# Patient Record
Sex: MALE | Race: BLACK OR AFRICAN AMERICAN | NOT HISPANIC OR LATINO | Employment: FULL TIME | URBAN - METROPOLITAN AREA
[De-identification: names, ages, dates, MRNs, and addresses within clinical notes are randomized per-mention and may not be internally consistent; named-entity substitution may affect disease eponyms.]

---

## 2023-03-16 ENCOUNTER — NURSE TRIAGE (OUTPATIENT)
Dept: FAMILY MEDICINE | Facility: CLINIC | Age: 43
End: 2023-03-16

## 2023-03-16 NOTE — TELEPHONE ENCOUNTER
"Pt calling in regarding abdominal pain and pain with BM. Pt states he has a hx of \"stones\" in his body but he was unable to do the surgery because of cost. Pt states that it is \"too bad now with the pain I can't take it anymore.\" Pt has not been seen at  before, no records on file. Advised pt to be seen in ED due to pain, pt states \"I can't just be seen in the clinic?\" RN informed pt we would not be able to help with pain in clinic, should be seen in ED. No further questions.    Lavelle SULLIVAN RN    Reason for Disposition    SEVERE abdominal pain (e.g., excruciating)    Additional Information    Negative: Passed out (i.e., fainted, collapsed and was not responding)    Negative: Shock suspected (e.g., cold/pale/clammy skin, too weak to stand, low BP, rapid pulse)    Negative: Sounds like a life-threatening emergency to the triager    Negative: Chest pain    Negative: Pain is mainly in upper abdomen (if needed ask: 'is it mainly above the belly button?')    Protocols used: ABDOMINAL PAIN - MALE-A-OH    Lavelle SULLIVAN RN    "

## 2023-03-18 ENCOUNTER — HOSPITAL ENCOUNTER (EMERGENCY)
Facility: CLINIC | Age: 43
Discharge: HOME | End: 2023-03-18
Attending: EMERGENCY MEDICINE | Admitting: EMERGENCY MEDICINE
Payer: COMMERCIAL

## 2023-03-18 VITALS
OXYGEN SATURATION: 99 % | DIASTOLIC BLOOD PRESSURE: 82 MMHG | HEART RATE: 71 BPM | TEMPERATURE: 97.9 F | SYSTOLIC BLOOD PRESSURE: 131 MMHG | RESPIRATION RATE: 16 BRPM

## 2023-03-18 DIAGNOSIS — K60.2 ANAL FISSURE: ICD-10-CM

## 2023-03-18 PROCEDURE — 99284 EMERGENCY DEPT VISIT MOD MDM: CPT

## 2023-03-18 RX ORDER — NITROGLYCERIN 4 MG/G
1 OINTMENT RECTAL EVERY 12 HOURS
Qty: 30 G | Refills: 0 | Status: SHIPPED | OUTPATIENT
Start: 2023-03-18 | End: 2023-06-01

## 2023-03-18 RX ORDER — DOCUSATE SODIUM 100 MG/1
100 CAPSULE, LIQUID FILLED ORAL DAILY
Qty: 30 CAPSULE | Refills: 0 | Status: SHIPPED | OUTPATIENT
Start: 2023-03-18 | End: 2023-06-01

## 2023-03-18 RX ORDER — LIDOCAINE 50 MG/G
OINTMENT TOPICAL PRN
Qty: 50 G | Refills: 0 | Status: SHIPPED | OUTPATIENT
Start: 2023-03-18 | End: 2023-06-01

## 2023-03-18 ASSESSMENT — ENCOUNTER SYMPTOMS
ANAL BLEEDING: 1
VOMITING: 0
FEVER: 0
ABDOMINAL PAIN: 0

## 2023-03-19 NOTE — ED TRIAGE NOTES
Rectal pain x 2 weeks that is worse with defecation.  Pt states it is painful to sit.  He has not observed any blood in his stool.  Pt has remote hx/o hemorrhoids.

## 2023-03-19 NOTE — ED PROVIDER NOTES
History     Chief Complaint:  Perianal Pain     The history is provided by the patient.      Dariusz Teague is a healthy 43 year old male who presents with perianal pain. For the past two weeks, Dariusz has had worsening pain with bowel movements which is now intolerable and ultimately prompted his visit. He has seen very small amounts of blood after bowel movements. After his bowel movements, his pain is present for about an hour before improving. His wife looked at his anus and did not see any hemorrhoids or bleeding.    Dariusz denies constipation and straining with bowel movements. He denies abdominal pain, vomiting, or fever.     Independent Historian:   As above.     Review of External Notes: I reviewed the patient's CT scan result from December 2022. There is no evidence of gallbladder or kidney stones.     ROS:  Review of Systems   Constitutional: Negative for fever.   Gastrointestinal: Positive for anal bleeding and rectal pain (perianal pain). Negative for abdominal pain, constipation, diarrhea and vomiting.   All other systems reviewed and are negative.    Allergies:  Zolmitriptan    Medications:    Zyprexa  Pulmicort  Pepcid  Compazine     Past Medical History:    Plasmodium falciparum malaria  Migraine   Vitamin D deficiency     Past Surgical History:    Left arm superficial wound repair     Social History:  He presents to the ED with a friend. He is a  and father.   Wants to change primary care providers as his is out of network and too costly.    Physical Exam     Patient Vitals for the past 24 hrs:   BP Temp Pulse Resp SpO2   03/18/23 1915 131/82 97.9  F (36.6  C) 71 16 99 %       Physical Exam  General: Well-developed and well-nourished. Well appearing middle aged man. Cooperative.  Head:  Atraumatic.  Eyes:  Conjunctivae, lids, and sclerae are normal.  Neck:  Supple. Normal range of motion.  Resp:  No respiratory distress.   GI:  Soft. Non-distended. Non-tender.   Rectal: RICCO without  mass or return of blood or stool.  No external hemorrhoids.  Tenderness to palpation over the posterior midline without easily visualized fissure.   MS:  Normal ROM.   Skin:  Warm. Non-diaphoretic. No pallor.  Neuro:  Awake. A&Ox3. Normal strength.  Psych: Normal mood and affect. Normal speech.  Vitals reviewed.    Emergency Department Course   Emergency Department Course & Assessments:  Assessments:  2005 I evaluated the patient and obtained an initial history and exam.     Independent Interpretation (X-rays, CTs, rhythm strip):  N/A    Consultations/Discussion of Management or Tests:  N/A     Social Determinants of Health affecting care:   Wants to change primary care provider. Strong family and friend support. Poor understanding of prior diagnoses (appendicitis).    Disposition:  The patient was discharged.     Impression & Plan    Medical Decision Making:  Dariusz is a 43 year old man who has had worsening perianal pain after bowel movements for the last 2 weeks.  It now is taking an hour afterwards to resolve.  He has had some small amounts of blood.  He denies abdominal pain or other concerns.  It sounds like he had appendicitis in December for which he refused appendectomy and took antibiotics instead.  He is very concerned that this is the cause for his symptoms.    On exam he has no abdominal tenderness to warrant abdominal imaging.  On rectal exam he has tenderness to palpation over the posterior midline.  Although there is no visualized fissure here, historical features and this exam finding are most consistent with an anal fissure.  As such, serum studies and imaging can be deferred as they are unlikely to .  We discussed management of anal fissures including increased water and fiber with a daily stool softener and 3 times daily sitz baths.  I recommended use Tylenol as needed for pain and I also prescribed lidocaine ointment.  Nitroglycerin ointment was provided to promote healing over  the next month.  I encouraged him to follow-up with colorectal to ensure he is improving as expected but return precautions were provided.  All questions answered.  Amenable to discharge.    Diagnosis:    ICD-10-CM    1. Anal fissure  K60.2          Discharge Medications:  Discharge Medication List as of 3/18/2023  8:39 PM      START taking these medications    Details   docusate sodium (COLACE) 100 MG capsule Take 1 capsule (100 mg) by mouth daily, Disp-30 capsule, R-0, E-Prescribe      lidocaine (XYLOCAINE) 5 % external ointment Apply topically as needed for moderate pain (4-6)Disp-50 g, [id number]      nitroGLYcerin (RECTIV) 0.4 % OINT rectal ointment Place 1 inch (1.5 mg) rectally every 12 hours, Disp-30 g, R-0, E-Prescribe            Scribe Disclosure:  I, Rhoda Nile, am serving as a scribe at 7:54 PM on 3/18/2023 to document services personally performed by Lela Alfaro MD based on my observations and the provider's statements to me.   3/18/2023   Lela Alfaro MD Dixson, Kylie S, MD  04/10/23 2207

## 2023-03-19 NOTE — DISCHARGE INSTRUCTIONS
Increase water and fiber.  Daily stool softener.  3 times a day sitz bath. (Fill bathtub with 2 to 3 inches of warm water and sit in it, immersing your anus, for 10 to 15 minutes.  Make sure you have dried the area afterwards.)  Tylenol as needed for pain.  You can also use the prescribed lidocaine (numbing ointment).  Twice daily nitroglycerin to help promote blood flow and healing.  You will need to do all of this for 1 month as these take quite some time to heal.  Follow-up with colorectal surgery to ensure you are improving.  Return with worsening symptoms or new concerns.

## 2023-04-10 ASSESSMENT — ENCOUNTER SYMPTOMS
CONSTIPATION: 0
DIARRHEA: 0
RECTAL PAIN: 1

## 2023-05-08 ENCOUNTER — TELEPHONE (OUTPATIENT)
Dept: FAMILY MEDICINE | Facility: CLINIC | Age: 43
End: 2023-05-08
Payer: COMMERCIAL

## 2023-05-08 ENCOUNTER — NURSE TRIAGE (OUTPATIENT)
Dept: NURSING | Facility: CLINIC | Age: 43
End: 2023-05-08
Payer: COMMERCIAL

## 2023-05-08 NOTE — TELEPHONE ENCOUNTER
Central scheduling calling to transfer patient call from pt. Pt has not been seen in primary care through [facility]. Advised central scheduling transfer call to [facility] nurse advisor line for non-established patients. Provided phone numbe for [facility] line.    Central scheduling was given an opportunity to ask questions, verbalized understanding of plan, and is agreeable.    Sara Eckert RN

## 2023-05-08 NOTE — TELEPHONE ENCOUNTER
"Nurse Triage SBAR    Is this a 2nd Level Triage? NO    Situation: Headache    Background: patient transferred from scheduling. Patient very tearful stating he has had a headache for the past 10 days.  States he is in a lot of pain and \"can't take it anymore\". Denies any weakness and numbness in his extremities. Denies head injury. States that he can get up and walk by himself.  States he thinks he has had migraines in the past. States this is the worse headache he has ever had.     Assessment: Severe headache    Protocol Recommended Disposition:   Go To ED/[facility] Now (Or To Office With PCP Approval)    Recommendation:     Patient advised to go to the ED today as he is not established with a PCP through [facility]. Care advice and call back precautions discussed. Patient asked to be transferred to scheduling to set up an appointment to establish care.  Patient verbalized understanding and agreement with the plan of care.      N/A     JOHNNY LLOYD RN      Does the patient meet one of the following criteria for ADS visit consideration? No    Reason for Disposition    SEVERE headache, states 'worst headache' of life    Additional Information    Negative: Difficult to awaken or acting confused (e.g., disoriented, slurred speech)    Negative: Weakness of the face, arm or leg on one side of the body and new-onset    Negative: Numbness of the face, arm or leg on one side of the body and new-onset    Negative: Loss of speech or garbled speech and new-onset    Negative: Passed out (i.e., fainted, collapsed and was not responding)    Negative: Sounds like a life-threatening emergency to the triager    Negative: Followed a head injury within last 3 days    Negative: Traumatic Brain Injury (TBI) is suspected    Negative: Sinus pain of forehead and yellow or green nasal discharge    Negative: Pregnant    Negative: Unable to walk without falling    Negative: Stiff neck (can't touch chin to chest)    Negative: Possibility of carbon " monoxide exposure    Protocols used: HEADACHE-A-[place]

## 2023-05-09 PROBLEM — E55.9 VITAMIN D DEFICIENCY: Status: ACTIVE | Noted: 2023-05-09

## 2023-05-09 RX ORDER — OLANZAPINE 5 MG/1
10 TABLET, ORALLY DISINTEGRATING ORAL
COMMUNITY
Start: 2021-10-30 | End: 2023-05-10

## 2023-05-09 RX ORDER — SUCRALFATE 1 G/1
TABLET ORAL
COMMUNITY
Start: 2022-09-17 | End: 2023-05-10

## 2023-05-09 RX ORDER — PROCHLORPERAZINE MALEATE 10 MG
TABLET ORAL
COMMUNITY
Start: 2022-12-01 | End: 2023-05-10

## 2023-05-09 RX ORDER — FAMOTIDINE 20 MG/1
1 TABLET, FILM COATED ORAL
COMMUNITY
Start: 2022-09-17 | End: 2023-05-10

## 2023-05-09 RX ORDER — BUDESONIDE 180 UG/1
180 AEROSOL, POWDER RESPIRATORY (INHALATION) 2 TIMES DAILY
COMMUNITY
Start: 2022-01-02 | End: 2023-05-10

## 2023-05-10 ENCOUNTER — OFFICE VISIT (OUTPATIENT)
Dept: FAMILY MEDICINE | Facility: CLINIC | Age: 43
End: 2023-05-10
Payer: COMMERCIAL

## 2023-05-10 VITALS
SYSTOLIC BLOOD PRESSURE: 112 MMHG | BODY MASS INDEX: 30.35 KG/M2 | DIASTOLIC BLOOD PRESSURE: 76 MMHG | WEIGHT: 212 LBS | TEMPERATURE: 97.2 F | RESPIRATION RATE: 16 BRPM | HEART RATE: 96 BPM | HEIGHT: 70 IN | OXYGEN SATURATION: 99 %

## 2023-05-10 DIAGNOSIS — R73.03 PREDIABETES: ICD-10-CM

## 2023-05-10 DIAGNOSIS — K60.2 ANAL FISSURE: ICD-10-CM

## 2023-05-10 DIAGNOSIS — E55.9 HYPOVITAMINOSIS D: ICD-10-CM

## 2023-05-10 DIAGNOSIS — Z11.59 NEED FOR HEPATITIS C SCREENING TEST: ICD-10-CM

## 2023-05-10 DIAGNOSIS — M79.2 RADICULAR PAIN IN LEFT ARM: ICD-10-CM

## 2023-05-10 DIAGNOSIS — G43.909 MIGRAINE WITHOUT STATUS MIGRAINOSUS, NOT INTRACTABLE, UNSPECIFIED MIGRAINE TYPE: ICD-10-CM

## 2023-05-10 DIAGNOSIS — R51.9 DAILY HEADACHE: ICD-10-CM

## 2023-05-10 DIAGNOSIS — Z12.5 SCREENING PSA (PROSTATE SPECIFIC ANTIGEN): ICD-10-CM

## 2023-05-10 DIAGNOSIS — G44.011 INTRACTABLE EPISODIC CLUSTER HEADACHE: Primary | ICD-10-CM

## 2023-05-10 DIAGNOSIS — M54.2 CERVICALGIA: ICD-10-CM

## 2023-05-10 DIAGNOSIS — Z11.4 SCREENING FOR HUMAN IMMUNODEFICIENCY VIRUS WITHOUT PRESENCE OF RISK FACTORS: ICD-10-CM

## 2023-05-10 LAB
ALBUMIN UR-MCNC: NEGATIVE MG/DL
APPEARANCE UR: CLEAR
BACTERIA #/AREA URNS HPF: ABNORMAL /HPF
BASOPHILS # BLD AUTO: 0 10E3/UL (ref 0–0.2)
BASOPHILS NFR BLD AUTO: 1 %
BILIRUB UR QL STRIP: NEGATIVE
COLOR UR AUTO: YELLOW
EOSINOPHIL # BLD AUTO: 0.2 10E3/UL (ref 0–0.7)
EOSINOPHIL NFR BLD AUTO: 4 %
ERYTHROCYTE [DISTWIDTH] IN BLOOD BY AUTOMATED COUNT: 13.5 % (ref 10–15)
ERYTHROCYTE [SEDIMENTATION RATE] IN BLOOD BY WESTERGREN METHOD: 5 MM/HR (ref 0–15)
GLUCOSE UR STRIP-MCNC: NEGATIVE MG/DL
HBA1C MFR BLD: 5.7 % (ref 0–5.6)
HCT VFR BLD AUTO: 44.4 % (ref 40–53)
HGB BLD-MCNC: 14 G/DL (ref 13.3–17.7)
HGB UR QL STRIP: ABNORMAL
IMM GRANULOCYTES # BLD: 0 10E3/UL
IMM GRANULOCYTES NFR BLD: 0 %
KETONES UR STRIP-MCNC: NEGATIVE MG/DL
LEUKOCYTE ESTERASE UR QL STRIP: NEGATIVE
LYMPHOCYTES # BLD AUTO: 2 10E3/UL (ref 0.8–5.3)
LYMPHOCYTES NFR BLD AUTO: 39 %
MCH RBC QN AUTO: 26.4 PG (ref 26.5–33)
MCHC RBC AUTO-ENTMCNC: 31.5 G/DL (ref 31.5–36.5)
MCV RBC AUTO: 84 FL (ref 78–100)
MONOCYTES # BLD AUTO: 0.3 10E3/UL (ref 0–1.3)
MONOCYTES NFR BLD AUTO: 7 %
MUCOUS THREADS #/AREA URNS LPF: PRESENT /LPF
NEUTROPHILS # BLD AUTO: 2.5 10E3/UL (ref 1.6–8.3)
NEUTROPHILS NFR BLD AUTO: 50 %
NITRATE UR QL: NEGATIVE
PH UR STRIP: 7 [PH] (ref 5–7)
PLATELET # BLD AUTO: 308 10E3/UL (ref 150–450)
RBC # BLD AUTO: 5.3 10E6/UL (ref 4.4–5.9)
RBC #/AREA URNS AUTO: ABNORMAL /HPF
SP GR UR STRIP: 1.02 (ref 1–1.03)
SQUAMOUS #/AREA URNS AUTO: ABNORMAL /LPF
UROBILINOGEN UR STRIP-ACNC: 0.2 E.U./DL
WBC # BLD AUTO: 5.1 10E3/UL (ref 4–11)
WBC #/AREA URNS AUTO: ABNORMAL /HPF

## 2023-05-10 PROCEDURE — 86140 C-REACTIVE PROTEIN: CPT | Performed by: PHYSICIAN ASSISTANT

## 2023-05-10 PROCEDURE — 82728 ASSAY OF FERRITIN: CPT | Performed by: PHYSICIAN ASSISTANT

## 2023-05-10 PROCEDURE — G0103 PSA SCREENING: HCPCS | Performed by: PHYSICIAN ASSISTANT

## 2023-05-10 PROCEDURE — 82607 VITAMIN B-12: CPT | Performed by: PHYSICIAN ASSISTANT

## 2023-05-10 PROCEDURE — 83036 HEMOGLOBIN GLYCOSYLATED A1C: CPT | Performed by: PHYSICIAN ASSISTANT

## 2023-05-10 PROCEDURE — 36415 COLL VENOUS BLD VENIPUNCTURE: CPT | Performed by: PHYSICIAN ASSISTANT

## 2023-05-10 PROCEDURE — 85652 RBC SED RATE AUTOMATED: CPT | Performed by: PHYSICIAN ASSISTANT

## 2023-05-10 PROCEDURE — 99204 OFFICE O/P NEW MOD 45 MIN: CPT | Performed by: PHYSICIAN ASSISTANT

## 2023-05-10 PROCEDURE — 80050 GENERAL HEALTH PANEL: CPT | Performed by: PHYSICIAN ASSISTANT

## 2023-05-10 PROCEDURE — 80061 LIPID PANEL: CPT | Performed by: PHYSICIAN ASSISTANT

## 2023-05-10 PROCEDURE — 86803 HEPATITIS C AB TEST: CPT | Performed by: PHYSICIAN ASSISTANT

## 2023-05-10 PROCEDURE — 82306 VITAMIN D 25 HYDROXY: CPT | Performed by: PHYSICIAN ASSISTANT

## 2023-05-10 PROCEDURE — 86618 LYME DISEASE ANTIBODY: CPT | Performed by: PHYSICIAN ASSISTANT

## 2023-05-10 PROCEDURE — 81001 URINALYSIS AUTO W/SCOPE: CPT | Performed by: PHYSICIAN ASSISTANT

## 2023-05-10 PROCEDURE — 87389 HIV-1 AG W/HIV-1&-2 AB AG IA: CPT | Performed by: PHYSICIAN ASSISTANT

## 2023-05-10 RX ORDER — TOPIRAMATE 25 MG/1
TABLET, FILM COATED ORAL
Qty: 180 TABLET | Refills: 0 | Status: SHIPPED | OUTPATIENT
Start: 2023-05-10 | End: 2023-06-13

## 2023-05-10 RX ORDER — SUMATRIPTAN 50 MG/1
50 TABLET, FILM COATED ORAL
Qty: 10 TABLET | Refills: 0 | Status: SHIPPED | OUTPATIENT
Start: 2023-05-10 | End: 2023-05-17

## 2023-05-10 ASSESSMENT — PATIENT HEALTH QUESTIONNAIRE - PHQ9
10. IF YOU CHECKED OFF ANY PROBLEMS, HOW DIFFICULT HAVE THESE PROBLEMS MADE IT FOR YOU TO DO YOUR WORK, TAKE CARE OF THINGS AT HOME, OR GET ALONG WITH OTHER PEOPLE: EXTREMELY DIFFICULT
SUM OF ALL RESPONSES TO PHQ QUESTIONS 1-9: 6
SUM OF ALL RESPONSES TO PHQ QUESTIONS 1-9: 6

## 2023-05-10 NOTE — PROGRESS NOTES
Assessment & Plan     Intractable episodic cluster headache  Daily headache  Radicular pain in left arm  Cervicalgia  Migraine without status migrainosus, not intractable, unspecified migraine type  Extensive laboratory work-up to rule out metabolic etiology.  No obvious abnormalities noted aside from prediabetes and low vitamin D levels.  Recommend daily topiramate initiation for headache prevention as well as trial of sumatriptan for severe headaches.  Did discuss medication overuse headache and strongly recommended that he taper/discontinue daily Excedrin use as this is likely worsening symptomatology.  Due to left radicular symptoms and worsening headaches recommend MRI brain and cervical spine with and without contrast.  Follow-up with headache neurology clinic as soon as possible as well.  All questions answered the patient satisfaction.  - MR Brain w/o & w Contrast  - Lyme Disease Total Abs Bld with Reflex to Confirm CLIA  - topiramate (TOPAMAX) 25 MG tablet  Dispense: 180 tablet; Refill: 0  - Lipid panel reflex to direct LDL Non-fasting  - topiramate (TOPAMAX) 25 MG tablet  Dispense: 180 tablet; Refill: 0  - SUMAtriptan (IMITREX) 50 MG tablet  Dispense: 10 tablet; Refill: 0  - Hemoglobin A1c  - UA with Microscopic reflex to Culture - lab collect  - CBC with platelets and differential  - Comprehensive metabolic panel (BMP + Alb, Alk Phos, ALT, AST, Total. Bili, TP)  - Ferritin  - Vitamin B12  - TSH with free T4 reflex  - Vitamin D Deficiency  - Lyme Disease Total Abs Bld with Reflex to Confirm CLIA  - CRP, inflammation  - ESR: Erythrocyte sedimentation rate  - Hemoglobin A1c  - UA with Microscopic reflex to Culture - lab collect  - UA Microscopic with Reflex to Culture  - MR Cervical Spine w/o & w Contrast  - Adult Neurology  Referral    Anal fissure  Patient following with colorectal surgery    Prediabetes  Diet and exercise efforts encouraged    Hypovitaminosis D  Very low vitamin D levels.  Will  "replace with daily 5000 IU vitamin D3 and prescription strength 50,000 IU once weekly.  - vitamin D2 (ERGOCALCIFEROL) 61913 units (1250 mcg) capsule  Dispense: 12 capsule; Refill: 3  - Vitamin D3 (CHOLECALCIFEROL) 125 MCG (5000 UT) tablet  Dispense: 100 tablet; Refill: 3    Screening for human immunodeficiency virus without presence of risk factors  Routine screening  - HIV Antigen Antibody Combo    Need for hepatitis C screening test  Routine screening  - Hepatitis C Screen Reflex to HCV RNA Quant and Genotype    Screening PSA (prostate specific antigen)  Routine screening  - PSA, screen      Review of prior external note(s) from - [facility] information from [facility] reviewed  54 minutes spent by me on the date of the encounter doing chart review, history and exam, documentation and further activities per the note     MED REC REQUIRED  Post Medication Reconciliation Status:  Discharge medications reconciled and changed, see notes/orders    BMI:   Estimated body mass index is 30.42 kg/m  as calculated from the following:    Height as of this encounter: 1.778 m (5' 10\").    Weight as of this encounter: 96.2 kg (212 lb).   Weight management plan: Discussed healthy diet and exercise guidelines        Elvira Almonte PA-C  [facility] PRIOR LUZ ELENA Arzola is a 43 year old, presenting for the following health issues:  ER F/U        5/10/2023     1:46 PM   Additional Questions   Roomed by Lucero Ro CMA   Accompanied by Self     HPI     ED/UC Followup:    Facility:  [facility] Emergency Dept.  Date of visit: 3/18/2023  Reason for visit: Rectal/perineal Pain Anal fissure  Current Status: Patient stated the medicine they gave him works but sometimes doesn't work.    Seeing Mi Ramírez - topical and watchful    docusate sodium (COLACE) 100 MG capsule Take 1 capsule (100 mg) by mouth daily, Disp-30 capsule, R-0, E-Prescribe       lidocaine (XYLOCAINE) 5 % external ointment " "Apply topically as needed for moderate pain (4-6)Disp-50 g, [id number]       nitroGLYcerin (RECTIV) 0.4 % OINT rectal ointment      Migraine     Since your last clinic visit, how have your headaches changed?  No change    How often are you getting headaches or migraines? All the time     Are you able to do normal daily activities when you have a migraine? No    Are you taking rescue/relief medications? (Select all that apply) Excedrin    How helpful is your rescue/relief medication?  I get no relief    Are you taking any medications to prevent migraines? (Select all that apply)  No    In the past 4 weeks, how often have you gone to urgent care or the emergency room because of your headaches?  3 or more     Patient reports that for the last 2 months he has had a severe headache that he wakes up with daily.  Denies any blurred vision/double vision.  Denies any head trauma.  Takes a significant amount of Excedrin on a daily basis (reports taking 3-4 over-the-counter tabs every 3-4 hours for the last few months).  This does not completely abebe his headache but it makes it more tolerable.  Occasionally he has had to miss work due to the severity of his headaches.  His employer is asked him to have [facility] paperwork filled out for these instances.  Does occasionally get nauseated, sensitive to light and sound.  Has had brain imaging (see below).  Was also evaluated at some point in the past by [facility] neurological clinic in [place].  Has never been on Imitrex or similar.  Thinks that he was told he has cluster headaches.  Does not wear glasses and has not had his vision checked recently.    His recent headaches also include a strange \"warm \"sensation down the entirety of his left upper extremity.  He does have some discomfort in his neck as well.    MR ANGIO HEAD WO CONTRAST 6/15/2018 - [facility]  FINDINGS:   Distal vertebral and basilar arteries are normally patent. Distal cervical through supraclinoid segments of the " "internal carotid arteries are normally patent. Anterior communicating artery is patent. First and 2nd order branches of the cerebral arteries, as viewed, are normal.     MR BRAIN WO CONTRAST - 6/15/2018 - [facility]    IMPRESSION:   1. No significant change, no restricted diffusion and no acute intracranial finding.   2. 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 No evidence for associated blood products.   3. Benign mucosal thickening paranasal sinuses has doubtful clinical significance.     MR ANGIO NECK W/WO CONTRAST - 6/15/2018 - [facility]    FINDINGS:   The innominate and visualized portions of both subclavian arteries are normal.   Both common carotid arteries, common bifurcations and internal carotid arteries are normally patent.   Both vertebral arteries are normally patent.     IMPRESSION:   Normal.     MR BRAIN WO CONTRAST - 10/30/2021 - [facility]     Findings:   The ventricles, sulci and gyri are normal size, shape and contour for age.  The midline structures are centrally located with no evidence of shift.  There are no suspicious intra or extra-axial fluid collections.  No region of restricted diffusion.  Expected flow voids in the cavernous carotids and basilar artery. 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     Impression:   1. Stable no radiographic evidence of acute intracranial abnormality.           Review of Systems   Constitutional, HEENT, cardiovascular, pulmonary, gi and gu systems are negative, except as otherwise noted.      Review of Systems    Objective    /76   Pulse 96   Temp 97.2  F (36.2  C) (Tympanic)   Resp 16   Ht 1.778 m (5' 10\")   Wt 96.2 kg (212 lb)   SpO2 99%   BMI 30.42 kg/m    Body mass index is 30.42 kg/m .  Physical Exam   GENERAL: healthy, alert and no distress  EYES: Eyes grossly normal to inspection, PERRL and conjunctivae and sclerae normal  HENT: ear canals and TM's normal, nose and mouth without ulcers or lesions  NECK: no " adenopathy, no asymmetry, masses, or scars and thyroid normal to palpation  RESP: lungs clear to auscultation - no rales, rhonchi or wheezes  CV: regular rate and rhythm, normal S1 S2, no S3 or S4, no murmur, click or rub, no peripheral edema and peripheral pulses strong  ABDOMEN: soft, nontender, no hepatosplenomegaly, no masses and bowel sounds normal  MS: no gross musculoskeletal defects noted, no edema  SKIN: no suspicious lesions or rashes  NEURO: cranial nerves II-XII grossly intact, point to point motions intact, RRM intact, able to heel toe walk, able to hop on one foot, and negative Romberg   PSYCH: mentation appears normal, affect normal/bright    Results for orders placed or performed in visit on 05/10/23   Comprehensive metabolic panel (BMP + Alb, Alk Phos, ALT, AST, Total. Bili, TP)     Status: Abnormal   Result Value Ref Range    Sodium 145 136 - 145 mmol/L    Potassium 4.1 3.4 - 5.3 mmol/L    Chloride 108 (H) 98 - 107 mmol/L    Carbon Dioxide (CO2) 24 22 - 29 mmol/L    Anion Gap 13 7 - 15 mmol/L    Urea Nitrogen 11.8 6.0 - 20.0 mg/dL    Creatinine 1.17 0.67 - 1.17 mg/dL    Calcium 9.4 8.6 - 10.0 mg/dL    Glucose 72 70 - 99 mg/dL    Alkaline Phosphatase 66 40 - 129 U/L    AST 31 10 - 50 U/L    ALT 26 10 - 50 U/L    Protein Total 7.6 6.4 - 8.3 g/dL    Albumin 4.6 3.5 - 5.2 g/dL    Bilirubin Total 0.5 <=1.2 mg/dL    GFR Estimate 79 >60 mL/min/1.73m2   Ferritin     Status: Normal   Result Value Ref Range    Ferritin 213 31 - 409 ng/mL   Vitamin B12     Status: Normal   Result Value Ref Range    Vitamin B12 583 232 - 1,245 pg/mL   TSH with free T4 reflex     Status: Normal   Result Value Ref Range    TSH 1.17 0.30 - 4.20 uIU/mL   Vitamin D Deficiency     Status: Abnormal   Result Value Ref Range    Vitamin D, Total (25-Hydroxy) 11 (L) 20 - 75 ug/L    Narrative    Season, race, dietary intake, and treatment affect the concentration of 25-hydroxy-Vitamin D. Values may decrease during winter months and  increase during summer months. Values 20-29 ug/L may indicate Vitamin D insufficiency and values <20 ug/L may indicate Vitamin D deficiency.    Vitamin D determination is routinely performed by an immunoassay specific for 25 hydroxyvitamin D3.  If an individual is on vitamin D2(ergocalciferol) supplementation, please specify 25 OH vitamin D2 and D3 level determination by LCMSMS test [id number].     Lyme Disease Total Abs Bld with Reflex to Confirm CLIA     Status: Normal   Result Value Ref Range    Lyme Disease Antibodies Total 0.14 <0.90   HIV Antigen Antibody Combo     Status: Normal   Result Value Ref Range    HIV Antigen Antibody Combo Nonreactive Nonreactive   CRP, inflammation     Status: Normal   Result Value Ref Range    CRP Inflammation <3.00 <5.00 mg/L   ESR: Erythrocyte sedimentation rate     Status: Normal   Result Value Ref Range    Erythrocyte Sedimentation Rate 5 0 - 15 mm/hr   Hepatitis C Screen Reflex to HCV RNA Quant and Genotype     Status: Normal   Result Value Ref Range    Hepatitis C Antibody Nonreactive Nonreactive    Narrative    Assay performance characteristics have not been established for newborns, infants, and children.   PSA, screen     Status: Normal   Result Value Ref Range    Prostate Specific Antigen Screen 0.41 0.00 - 2.50 ng/mL    Narrative    This result is obtained using the Roche Elecsys total PSA method on the damian [id number] immunoassay analyzer. Results obtained with different assay methods or kits cannot be used interchangeably.   Hemoglobin A1c     Status: Abnormal   Result Value Ref Range    Hemoglobin A1C 5.7 (H) 0.0 - 5.6 %   UA with Microscopic reflex to Culture - lab collect     Status: Abnormal    Specimen: Urine, Midstream   Result Value Ref Range    Color Urine Yellow Colorless, Straw, Light Yellow, Yellow    Appearance Urine Clear Clear    Glucose Urine Negative Negative mg/dL    Bilirubin Urine Negative Negative    Ketones Urine Negative Negative mg/dL    Specific Gravity  Urine 1.025 1.003 - 1.035    Blood Urine Trace (A) Negative    pH Urine 7.0 5.0 - 7.0    Protein Albumin Urine Negative Negative mg/dL    Urobilinogen Urine 0.2 0.2, 1.0 E.U./dL    Nitrite Urine Negative Negative    Leukocyte Esterase Urine Negative Negative   CBC with platelets and differential     Status: Abnormal   Result Value Ref Range    WBC Count 5.1 4.0 - 11.0 10e3/uL    RBC Count 5.30 4.40 - 5.90 10e6/uL    Hemoglobin 14.0 13.3 - 17.7 g/dL    Hematocrit 44.4 40.0 - 53.0 %    MCV 84 78 - 100 fL    MCH 26.4 (L) 26.5 - 33.0 pg    MCHC 31.5 31.5 - 36.5 g/dL    RDW 13.5 10.0 - 15.0 %    Platelet Count 308 150 - 450 10e3/uL    % Neutrophils 50 %    % Lymphocytes 39 %    % Monocytes 7 %    % Eosinophils 4 %    % Basophils 1 %    % Immature Granulocytes 0 %    Absolute Neutrophils 2.5 1.6 - 8.3 10e3/uL    Absolute Lymphocytes 2.0 0.8 - 5.3 10e3/uL    Absolute Monocytes 0.3 0.0 - 1.3 10e3/uL    Absolute Eosinophils 0.2 0.0 - 0.7 10e3/uL    Absolute Basophils 0.0 0.0 - 0.2 10e3/uL    Absolute Immature Granulocytes 0.0 <=0.4 10e3/uL   UA Microscopic with Reflex to Culture     Status: Abnormal   Result Value Ref Range    Bacteria Urine None Seen None Seen /HPF    RBC Urine 0-2 0-2 /HPF /HPF    WBC Urine 0-5 0-5 /HPF /HPF    Squamous Epithelials Urine None Seen None Seen /LPF    Mucus Urine Present (A) None Seen /LPF    Narrative    Urine Culture not indicated   Lipid panel reflex to direct LDL Non-fasting     Status: Abnormal   Result Value Ref Range    Cholesterol 215 (H) <200 mg/dL    Triglycerides 127 <150 mg/dL    Direct Measure HDL 40 >=40 mg/dL    LDL Cholesterol Calculated 150 (H) <=100 mg/dL    Non HDL Cholesterol 175 (H) <130 mg/dL    Narrative    Cholesterol  Desirable:  <200 mg/dL    Triglycerides  Normal:  Less than 150 mg/dL  Borderline High:  150-199 mg/dL  High:  200-499 mg/dL  Very High:  Greater than or equal to 500 mg/dL    Direct Measure HDL  Female:  Greater than or equal to 50 mg/dL   Male:   Greater than or equal to 40 mg/dL    LDL Cholesterol  Desirable:  <100mg/dL  Above Desirable:  100-129 mg/dL   Borderline High:  130-159 mg/dL   High:  160-189 mg/dL   Very High:  >= 190 mg/dL    Non HDL Cholesterol  Desirable:  130 mg/dL  Above Desirable:  130-159 mg/dL  Borderline High:  160-189 mg/dL  High:  190-219 mg/dL  Very High:  Greater than or equal to 220 mg/dL   CBC with platelets and differential     Status: Abnormal    Narrative    The following orders were created for panel order CBC with platelets and differential.  Procedure                               Abnormality         Status                     ---------                               -----------         ------                     CBC with platelets and d...[[id number]]  Abnormal            Final result                 Please view results for these tests on the individual orders.                   Answers for HPI/ROS submitted by the patient on 5/10/2023  If you checked off any problems, how difficult have these problems made it for you to do your work, take care of things at home, or get along with other people?: Extremely difficult  PHQ9 TOTAL SCORE: 6

## 2023-05-11 PROBLEM — R51.9 DAILY HEADACHE: Status: ACTIVE | Noted: 2023-05-11

## 2023-05-11 PROBLEM — R73.03 PREDIABETES: Status: ACTIVE | Noted: 2023-05-11

## 2023-05-11 PROBLEM — M54.2 CERVICALGIA: Status: ACTIVE | Noted: 2023-05-11

## 2023-05-11 PROBLEM — M79.2 RADICULAR PAIN IN LEFT ARM: Status: ACTIVE | Noted: 2023-05-11

## 2023-05-11 PROBLEM — K60.2 ANAL FISSURE: Status: ACTIVE | Noted: 2023-05-11

## 2023-05-11 LAB
ALBUMIN SERPL BCG-MCNC: 4.6 G/DL (ref 3.5–5.2)
ALP SERPL-CCNC: 66 U/L (ref 40–129)
ALT SERPL W P-5'-P-CCNC: 26 U/L (ref 10–50)
ANION GAP SERPL CALCULATED.3IONS-SCNC: 13 MMOL/L (ref 7–15)
AST SERPL W P-5'-P-CCNC: 31 U/L (ref 10–50)
BILIRUB SERPL-MCNC: 0.5 MG/DL
BUN SERPL-MCNC: 11.8 MG/DL (ref 6–20)
CALCIUM SERPL-MCNC: 9.4 MG/DL (ref 8.6–10)
CHLORIDE SERPL-SCNC: 108 MMOL/L (ref 98–107)
CHOLEST SERPL-MCNC: 215 MG/DL
CREAT SERPL-MCNC: 1.17 MG/DL (ref 0.67–1.17)
CRP SERPL-MCNC: <3 MG/L
DEPRECATED CALCIDIOL+CALCIFEROL SERPL-MC: 11 UG/L (ref 20–75)
DEPRECATED HCO3 PLAS-SCNC: 24 MMOL/L (ref 22–29)
FERRITIN SERPL-MCNC: 213 NG/ML (ref 31–409)
GFR SERPL CREATININE-BSD FRML MDRD: 79 ML/MIN/1.73M2
GLUCOSE SERPL-MCNC: 72 MG/DL (ref 70–99)
HCV AB SERPL QL IA: NONREACTIVE
HDLC SERPL-MCNC: 40 MG/DL
HIV 1+2 AB+HIV1 P24 AG SERPL QL IA: NONREACTIVE
LDLC SERPL CALC-MCNC: 150 MG/DL
NONHDLC SERPL-MCNC: 175 MG/DL
POTASSIUM SERPL-SCNC: 4.1 MMOL/L (ref 3.4–5.3)
PROT SERPL-MCNC: 7.6 G/DL (ref 6.4–8.3)
PSA SERPL DL<=0.01 NG/ML-MCNC: 0.41 NG/ML (ref 0–2.5)
SODIUM SERPL-SCNC: 145 MMOL/L (ref 136–145)
TRIGL SERPL-MCNC: 127 MG/DL
TSH SERPL DL<=0.005 MIU/L-ACNC: 1.17 UIU/ML (ref 0.3–4.2)
VIT B12 SERPL-MCNC: 583 PG/ML (ref 232–1245)

## 2023-05-11 RX ORDER — ERGOCALCIFEROL 1.25 MG/1
50000 CAPSULE, LIQUID FILLED ORAL WEEKLY
Qty: 12 CAPSULE | Refills: 3 | Status: SHIPPED | OUTPATIENT
Start: 2023-05-11

## 2023-05-12 ENCOUNTER — NURSE TRIAGE (OUTPATIENT)
Dept: NURSING | Facility: CLINIC | Age: 43
End: 2023-05-12
Payer: COMMERCIAL

## 2023-05-12 LAB — B BURGDOR IGG+IGM SER QL: 0.14

## 2023-05-12 NOTE — RESULT ENCOUNTER NOTE
"Dariusz  I have reviewed your recent test results:    -Normal red blood cell (hgb) levels, normal white blood cell count and normal platelet levels.  -PSA (prostate specific antigen) test is normal.  This indicates a low likelihood of prostate cancer.  ADVISE: rechecking this in 1 year.  -LDL(bad) cholesterol level is elevated which can increase your heart disease risk.  A diet high in fat and simple carbohydrates, genetics and being overweight can contribute to this. ADVISE: exercising 150 minutes of aerobic exercise per week (30 minutes for 5 days per week or 50 minutes for 3 days per week are options) and eating a low saturated fat/low carbohydrate diet are helpful to improve this.  -Liver and gallbladder tests (ALT,AST, Alk phos,bilirubin) are normal.  -Kidney function (GFR) is normal.  -Sodium is normal.  -Potassium is normal.  -Calcium is normal.  -Glucose is normal but your hemoglobin A1C is slightly elevated and may be a sign of early diabetes (prediabetes). ADVISE:: eating a low carbohydrate diet, exercising, trying to lose weight (if necessary) and rechecking your glucose level in 12 months.  -TSH (thyroid stimulating hormone) level is normal which indicates normal thyroid function.  -Hepatitis C antibody screen test shows no signs of a previous hepatitis C infection.  -HIV test is normal.  -Your Vitamin D level is VERY low and daily supplementation is recommended along with weekly \"arben dose\" prescription Vitamin D to \"boost\" your levels.  Please start a Vitamin D3 supplement of 5000 international unit(s) daily.  Typically this supplement is recommended to continue indefinitely as our sun exposure is quite limited (and sunscreen use limits what we absorb naturally in the summer months).   The 50,000 international unit(s) VIT D prescription was sent to your pharmacy to take once weekly.  -Ferritin (iron) level is normal.  -Urine is normal.  -Lymes is normal/negative  -B12 level is normal  -CRP/ESR " (inflammatory markers)    For additional lab test information, [email or web address] is an excellent reference.     If you have any questions please do not hesitate to contact our office via phone ([phone]) or MyChart.    Healthy regards,     Elvira Almonte MBA, MS, PA-C  [facility]

## 2023-05-12 NOTE — TELEPHONE ENCOUNTER
Had labs done on May 10.  He was calling to find out more information on some of the abdnormal lab results.  He had questions about to may his cholesterol better.  Vit D level low he will go to pharmacy and pick op medications prescribed.  Patient would like provider to call him.  Writer will send message to Elvira Suarez RN, [facility] Triage Nurse Advisor

## 2023-05-17 ENCOUNTER — NURSE TRIAGE (OUTPATIENT)
Dept: FAMILY MEDICINE | Facility: CLINIC | Age: 43
End: 2023-05-17
Payer: COMMERCIAL

## 2023-05-17 DIAGNOSIS — M79.2 RADICULAR PAIN IN LEFT ARM: ICD-10-CM

## 2023-05-17 DIAGNOSIS — G43.909 MIGRAINE WITHOUT STATUS MIGRAINOSUS, NOT INTRACTABLE, UNSPECIFIED MIGRAINE TYPE: ICD-10-CM

## 2023-05-17 DIAGNOSIS — G44.011 INTRACTABLE EPISODIC CLUSTER HEADACHE: ICD-10-CM

## 2023-05-17 RX ORDER — SUMATRIPTAN 50 MG/1
50 TABLET, FILM COATED ORAL
Qty: 10 TABLET | Refills: 0 | Status: SHIPPED | OUTPATIENT
Start: 2023-05-17 | End: 2023-06-28

## 2023-05-17 NOTE — TELEPHONE ENCOUNTER
MIGRAINE MEDICATION:  * If your doctor has prescribed specific medication for your migraine, take it as directed as soon as the migraine starts.    Huddled with Won JAMES - pt has stopped his Excedrin cold turkey have him wean off of it, make sure he is taking the Topamax as directed, review with him how to take the imitrex. Make sure he has the MRI scheduled and gets it done.       Rn advised pt on the above, advised pt to try taking 1 Excedrin now and lay down with cold pack to the neck.     Pt stated that he will try the above but he is now OUT of the imitrex as he has been taking it daily and would like a refill - Rn advised on how to take the imitrex and it is not a daily medication, reviewed dosing and how and when to take the medication. Reviewed worsening symptoms and if they were to happen to go to the ER     Patient stated an understanding and agreed with plan.    Would provider be willing to refill Imitrex ?     Please advise       Lesley Sweeney RN, BSN  [facility]      Reason for Disposition    SEVERE headache and not relieved by pain meds    Additional Information    Negative: Difficult to awaken or acting confused (e.g., disoriented, slurred speech)    Negative: Weakness of the face, arm or leg on one side of the body and new-onset    Negative: Numbness of the face, arm or leg on one side of the body and new-onset    Negative: Loss of speech or garbled speech and new-onset    Negative: Passed out (i.e., fainted, collapsed and was not responding)    Negative: Sounds like a life-threatening emergency to the triager    Negative: Followed a head injury within last 3 days    Negative: Traumatic Brain Injury (TBI) is suspected    Negative: Sinus pain of forehead and yellow or green nasal discharge    Negative: Pregnant    Negative: Unable to walk without falling    Negative: Stiff neck (can't touch chin to chest)    Negative: Possibility of carbon monoxide exposure    Negative:  "SEVERE headache, states 'worst headache' of life    Negative: SEVERE headache, sudden-onset (i.e., reaching maximum intensity within seconds to 1 hour)    Negative: Severe pain in one eye    Negative: Loss of vision or double vision  (Exception: Same as prior migraines.)    Negative: Patient sounds very sick or weak to the triager    Negative: Fever > 103 F (39.4 C)    Negative: Fever > 100.0 F (37.8 C) and has diabetes mellitus or a weak immune system (e.g., HIV positive, cancer chemotherapy, organ transplant, splenectomy, chronic steroids)    Answer Assessment - Initial Assessment Questions  1. LOCATION: \"Where does it hurt?\"       Left side of head in eye and neck     2. ONSET: \"When did the headache start?\" (Minutes, hours or days)      5 months     3. PATTERN: \"Does the pain come and go, or has it been constant since it started?\"      Does not know - it happens so often, it comes an goes many times a day     4. SEVERITY: \"How bad is the pain?\" and \"What does it keep you from doing?\"  (e.g., Scale 1-10; mild, moderate, or severe)    - MILD (1-3): doesn't interfere with normal activities     - MODERATE (4-7): interferes with normal activities or awakens from sleep     - SEVERE (8-10): excruciating pain, unable to do any normal activities         Severe     5. RECURRENT SYMPTOM: \"Have you ever had headaches before?\" If Yes, ask: \"When was the last time?\" and \"What happened that time?\"       Its been ongoing  For 5 months    6. CAUSE: \"What do you think is causing the headache?\"      Unknown     7. MIGRAINE: \"Have you been diagnosed with migraine headaches?\" If Yes, ask: \"Is this headache similar?\"       Yes     8. HEAD INJURY: \"Has there been any recent injury to the head?\"       None   9. OTHER SYMPTOMS: \"Do you have any other symptoms?\" (fever, stiff neck, eye pain, sore throat, cold symptoms)  Has neck pain but its not stiff       Denies: fever, sore throat, cold symptoms.     10. PREGNANCY: \"Is there any " "chance you are pregnant?\" \"When was your last menstrual period?\"        None    Protocols used: HEADACHE-A-OH      "

## 2023-05-17 NOTE — TELEPHONE ENCOUNTER
Will refill x 1.  Pt must understand this is not intended for daily use - only for severe headaches.        Elvira Almonte MBA, MS, PA-C  [facility]

## 2023-05-21 ENCOUNTER — HEALTH MAINTENANCE LETTER (OUTPATIENT)
Age: 43
End: 2023-05-21

## 2023-05-27 ENCOUNTER — HOSPITAL ENCOUNTER (OUTPATIENT)
Dept: MRI IMAGING | Facility: CLINIC | Age: 43
Discharge: HOME | End: 2023-05-27
Attending: PHYSICIAN ASSISTANT
Payer: COMMERCIAL

## 2023-05-27 DIAGNOSIS — M79.2 RADICULAR PAIN IN LEFT ARM: ICD-10-CM

## 2023-05-27 DIAGNOSIS — R51.9 DAILY HEADACHE: ICD-10-CM

## 2023-05-27 DIAGNOSIS — G44.011 INTRACTABLE EPISODIC CLUSTER HEADACHE: ICD-10-CM

## 2023-05-27 PROCEDURE — 72156 MRI NECK SPINE W/O & W/DYE: CPT

## 2023-05-27 PROCEDURE — 70553 MRI BRAIN STEM W/O & W/DYE: CPT

## 2023-05-27 PROCEDURE — A9585 GADOBUTROL INJECTION: HCPCS | Performed by: PHYSICIAN ASSISTANT

## 2023-05-27 PROCEDURE — 255N000002 HC RX 255 OP 636: Performed by: PHYSICIAN ASSISTANT

## 2023-05-27 RX ORDER — GADOBUTROL 604.72 MG/ML
10 INJECTION INTRAVENOUS ONCE
Status: COMPLETED | OUTPATIENT
Start: 2023-05-27 | End: 2023-05-27

## 2023-05-27 RX ADMIN — GADOBUTROL 10 ML: 604.72 INJECTION INTRAVENOUS at 08:21

## 2023-05-30 NOTE — RESULT ENCOUNTER NOTE
Dariusz  I have reviewed your recent test results:    Great news!  Your brain and cervical spine MRIs are normal/negative for any new intracranial pathology.  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  This chronic & stable nature of this finding is reassuring that it is likely is NOT contributing to your current headaches.    Plan to follow up with neurology as planned on 8/23/2023 and with me before that time if symptoms are worsening/not improving.    For additional lab test information, [email or web address] is an excellent reference.     If you have any questions please do not hesitate to contact our office via phone ([phone]) or MyChart.    Healthy regards,     Elvira Almonte MBA, MS, PA-C  [facility]

## 2023-05-30 NOTE — RESULT ENCOUNTER NOTE
Dariusz  I have reviewed your recent test results:    Great news!  Your brain and cervical spine MRIs are normal/negative for any new intracranial pathology.  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  This chronic & stable nature of this finding is reassuring that it is likely is NOT contributing to your current headaches.    Plan to follow up with neurology as planned on 8/23/2023 and with me before that time if symptoms are worsening/not improving.    For additional lab test information, [email or web address] is an excellent reference.     If you have any questions please do not hesitate to contact our office via phone ([phone]) or MyChart.    Healthy regards,     Elvira Almonte MBA, MS, PA-C  [facility]

## 2023-05-31 ENCOUNTER — NURSE TRIAGE (OUTPATIENT)
Dept: FAMILY MEDICINE | Facility: CLINIC | Age: 43
End: 2023-05-31
Payer: COMMERCIAL

## 2023-05-31 NOTE — TELEPHONE ENCOUNTER
2nd level triage- YES     Situation     headaches are not getting better with his prescribed medications.     Background     MRI  On Saturday   Neurologist in August-   Daily headaches for the past 3 months   Maybe 2-3 days without.   Typically comes and goes through  out the day.     Assessment   Today the  Headache is worse and the medication has not helped (5 hours) -same location  Whole left side and left eye     Took 100mg Imitrex at 630am and Tylenol     No dizziness   no vomiting  Nausea still eating and drinking     Recommendations   Advised will talk to pcp and call him back with recommendations.   Advised sip on fluids and try and eat something  Advised can go to  anytime        Reason for Disposition    SEVERE headache (e.g., excruciating) and has had severe headaches before    SEVERE headache and not relieved by pain meds    Additional Information    Negative: Difficult to awaken or acting confused (e.g., disoriented, slurred speech)    Negative: Weakness of the face, arm or leg on one side of the body and new-onset    Negative: Numbness of the face, arm or leg on one side of the body and new-onset    Negative: Loss of speech or garbled speech and new-onset    Negative: Passed out (i.e., fainted, collapsed and was not responding)    Negative: Sounds like a life-threatening emergency to the triager    Negative: Followed a head injury within last 3 days    Negative: Traumatic Brain Injury (TBI) is suspected    Negative: Sinus pain of forehead and yellow or green nasal discharge    Negative: Pregnant    Negative: Unable to walk without falling    Negative: Stiff neck (can't touch chin to chest)    Negative: Possibility of carbon monoxide exposure    Negative: SEVERE headache, sudden-onset (i.e., reaching maximum intensity within seconds to 1 hour)    Negative: SEVERE headache, states 'worst headache' of life    Negative: Severe pain in one eye    Negative: Loss of vision or double vision  (Exception: Same  as prior migraines.)    Negative: Patient sounds very sick or weak to the triager    Negative: Fever > 103 F (39.4 C)    Negative: Fever > 100.0 F (37.8 C) and has diabetes mellitus or a weak immune system (e.g., HIV positive, cancer chemotherapy, organ transplant, splenectomy, chronic steroids)    Protocols used: HEADACHE-A-OH    Meme FRIEND RN   [facility] Triage

## 2023-05-31 NOTE — TELEPHONE ENCOUNTER
Called patient and explained providers note/recommendations    Patient is agreeable to plan.     Meme FRIEND RN   [facility]

## 2023-05-31 NOTE — TELEPHONE ENCOUNTER
I would recommend ER.  He may need a spinal tap as I have exhausted the majority of my treatment options in the primary care outpatient setting.    He could try to see if [facility] or [facility] can see him sooner than August - I just would want a sub specialist in headaches to address      Elvira Almonte MBA, MS, PA-C  [facility]

## 2023-06-01 ENCOUNTER — HOSPITAL ENCOUNTER (EMERGENCY)
Facility: CLINIC | Age: 43
Discharge: HOME | End: 2023-06-01
Attending: EMERGENCY MEDICINE | Admitting: EMERGENCY MEDICINE
Payer: COMMERCIAL

## 2023-06-01 VITALS
OXYGEN SATURATION: 100 % | DIASTOLIC BLOOD PRESSURE: 69 MMHG | WEIGHT: 211.42 LBS | TEMPERATURE: 97.2 F | BODY MASS INDEX: 30.34 KG/M2 | SYSTOLIC BLOOD PRESSURE: 121 MMHG | RESPIRATION RATE: 18 BRPM | HEART RATE: 72 BPM

## 2023-06-01 DIAGNOSIS — R51.9 ACUTE NONINTRACTABLE HEADACHE, UNSPECIFIED HEADACHE TYPE: ICD-10-CM

## 2023-06-01 PROCEDURE — 96361 HYDRATE IV INFUSION ADD-ON: CPT

## 2023-06-01 PROCEDURE — 99284 EMERGENCY DEPT VISIT MOD MDM: CPT | Mod: 25

## 2023-06-01 PROCEDURE — 96374 THER/PROPH/DIAG INJ IV PUSH: CPT

## 2023-06-01 PROCEDURE — 250N000011 HC RX IP 250 OP 636: Performed by: EMERGENCY MEDICINE

## 2023-06-01 PROCEDURE — 96375 TX/PRO/DX INJ NEW DRUG ADDON: CPT

## 2023-06-01 PROCEDURE — 258N000003 HC RX IP 258 OP 636: Performed by: EMERGENCY MEDICINE

## 2023-06-01 RX ORDER — KETOROLAC TROMETHAMINE 15 MG/ML
15 INJECTION, SOLUTION INTRAMUSCULAR; INTRAVENOUS ONCE
Status: COMPLETED | OUTPATIENT
Start: 2023-06-01 | End: 2023-06-01

## 2023-06-01 RX ORDER — DIPHENHYDRAMINE HYDROCHLORIDE 50 MG/ML
25 INJECTION INTRAMUSCULAR; INTRAVENOUS ONCE
Status: COMPLETED | OUTPATIENT
Start: 2023-06-01 | End: 2023-06-01

## 2023-06-01 RX ORDER — DEXAMETHASONE SODIUM PHOSPHATE 10 MG/ML
10 INJECTION, SOLUTION INTRAMUSCULAR; INTRAVENOUS ONCE
Status: COMPLETED | OUTPATIENT
Start: 2023-06-01 | End: 2023-06-01

## 2023-06-01 RX ORDER — METOCLOPRAMIDE HYDROCHLORIDE 5 MG/ML
10 INJECTION INTRAMUSCULAR; INTRAVENOUS ONCE
Status: COMPLETED | OUTPATIENT
Start: 2023-06-01 | End: 2023-06-01

## 2023-06-01 RX ADMIN — METOCLOPRAMIDE HYDROCHLORIDE 10 MG: 5 INJECTION INTRAMUSCULAR; INTRAVENOUS at 05:51

## 2023-06-01 RX ADMIN — KETOROLAC TROMETHAMINE 15 MG: 15 INJECTION, SOLUTION INTRAMUSCULAR; INTRAVENOUS at 05:50

## 2023-06-01 RX ADMIN — DIPHENHYDRAMINE HYDROCHLORIDE 25 MG: 50 INJECTION INTRAMUSCULAR; INTRAVENOUS at 05:49

## 2023-06-01 RX ADMIN — DEXAMETHASONE SODIUM PHOSPHATE 10 MG: 10 INJECTION, SOLUTION INTRAMUSCULAR; INTRAVENOUS at 05:49

## 2023-06-01 RX ADMIN — SODIUM CHLORIDE 1000 ML: 9 INJECTION, SOLUTION INTRAVENOUS at 05:48

## 2023-06-01 NOTE — ED TRIAGE NOTES
Pt crying hard to hear his responses says he gets a headache every day pt vomiting at triage     Triage Assessment     Row Name 06/01/23 0509       Triage Assessment (Adult)    Airway WDL WDL       Respiratory WDL    Respiratory WDL WDL       Skin Circulation/Temperature WDL    Skin Circulation/Temperature WDL WDL       Cardiac WDL    Cardiac WDL WDL       Peripheral/Neurovascular WDL    Peripheral Neurovascular WDL WDL       Cognitive/Neuro/Behavioral WDL    Cognitive/Neuro/Behavioral WDL WDL

## 2023-06-01 NOTE — ED PROVIDER NOTES
History     Chief Complaint:  Headache     HPI   Dariusz Teague is a 43 year old male who presents with daily headaches for the past 3 months.  He has been evaluated by his regular physician and has been referred to neurology.  He denies any trauma or fevers.  His pain is located mainly on the left side of his head.  He has had episodes like this in the past.  There is a chart history of cluster headache diagnosis as well as migraine diagnosis.  His Lyme disease screening TSH and labs from the 10th of this month are reviewed and otherwise unremarkable.  MRI of the brain and cervical spine was negative for acute pathology on the 27th of this month.    Independent Historian:    None, patient provides history above    Review of External Notes:  I reviewed the clinic note from May 10 as well as labs associated with that.  I also reviewed the MRI imaging on May 27 (see below).    IMPRESSION:  HEAD MRI:   1.  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  2.  No intracranial abnormality otherwise.     CERVICAL SPINE MRI:  1.  Normal MRI of the cervical spine.    Medications:    SUMAtriptan (IMITREX) 50 MG tablet  topiramate (TOPAMAX) 25 MG tablet  vitamin D2 (ERGOCALCIFEROL) 67629 units (1250 mcg) capsule  Vitamin D3 (CHOLECALCIFEROL) 125 MCG (5000 UT) tablet      Past Medical History:    Past Medical History:   Diagnosis Date     Cluster versus migraine headache syndrome      Past Surgical History:    No past surgical history on file.       Physical Exam     Patient Vitals for the past 24 hrs:   BP Temp Temp src Pulse Resp SpO2 Weight   06/01/23 0506 97/70 97.2  F (36.2  C) Temporal 95 18 100 % 95.9 kg (211 lb 6.7 oz)     Physical Exam  Nursing note and vitals reviewed.  Constitutional: Cooperative. Uncomfortable appearing.  Holding the left side of his head  HENT:   Mouth/Throat: Mucous membranes are normal.   No neck  rigidity  Eyes: Pupils are equal, round, and reactive to light. Extraocular movements intact  Cardiovascular: Normal rate, regular rhythm and normal heart sounds.    Pulmonary/Chest: Effort normal and breath sounds normal. No respiratory distress.   Abdominal: Normal appearance  Neurological: Alert. Oriented x3.  Strength normal.  Cranial nerves II through XII intact  Skin: Skin is warm and dry.  Psychiatric: Normal mood and affect.       Emergency Department Course     Laboratory:  Labs Ordered and Resulted from Time of ED Arrival to Time of ED Departure - No data to display     Interventions:  Medications   0.9% sodium chloride BOLUS (has no administration in time range)   dexamethasone PF (DECADRON) injection 10 mg (has no administration in time range)   ketorolac (TORADOL) injection 15 mg (has no administration in time range)   metoclopramide (REGLAN) injection 10 mg (has no administration in time range)   diphenhydrAMINE (BENADRYL) injection 25 mg (has no administration in time range)        Assessments:  0530: Patient evaluated and interventions as above ordered.  Chart review discussed with the patient including his MRI findings  0630: Recheck, patient is feeling much better.    Independent Interpretation (X-rays, CTs, rhythm strip):  None    Consultations/Discussion of Management or Tests:  None       Social Determinants of Health affecting care:  None:     Disposition:  The patient was discharged to home.     Impression & Plan      Medical Decision Makin-year-old gentleman who presents with headaches.  These sound like they occur in clusters and are suspicious for cluster headache phenomenon.  Migraine headaches are also within the differential.  MRI of the cervical spine and head were reviewed from just several days ago.  Clinical suspicion for intracranial mass lesion meningitis subarachnoid hemorrhage etc. is very low.  With the above interventions including placement of oxygen on arrival he is  doing much better.  I have encouraged follow-up with neurology.  In the meantime he knows the emergency department is always here should he need assistance    Diagnosis:    ICD-10-CM    1. Headache: Cluster versus migraine syndrome  R51.9               Jerry Lambert MD  06/01/23 0643

## 2023-06-08 ENCOUNTER — NURSE TRIAGE (OUTPATIENT)
Dept: FAMILY MEDICINE | Facility: CLINIC | Age: 43
End: 2023-06-08
Payer: COMMERCIAL

## 2023-06-08 NOTE — TELEPHONE ENCOUNTER
Priority call from scheduling, pt calling to schedule appt for headache. When attempted to transfer call, line was disconnected     Aggie MULLIGAN RN  [facility] [place][note 2]

## 2023-06-09 ENCOUNTER — MYC MEDICAL ADVICE (OUTPATIENT)
Dept: FAMILY MEDICINE | Facility: CLINIC | Age: 43
End: 2023-06-09
Payer: COMMERCIAL

## 2023-06-12 NOTE — TELEPHONE ENCOUNTER
Pt came in to the [place] clinic today (6/12/2023) upset that no one has called him back regarding this. He requested that I put in another message for him.

## 2023-06-12 NOTE — TELEPHONE ENCOUNTER
Please schedule patient for a 40-minute hospital follow-up visit (video is okay) with me tomorrow afternoon in the only spot available as we could do a trial of a treatment strategy for cluster headaches to see if this provides him with any relief.

## 2023-06-12 NOTE — TELEPHONE ENCOUNTER
Called patient and advised of providers note    Scheduled per provider     advised will call him around 245pm for checking in and virtual appointment will be with provider around 3pm or later  Future Appointments 6/12/2023 - 12/9/2023      Date Visit Type Length Department Provider     6/13/2023  3:00 PM ED/HOSP FOLLOW UP 40 min [facility] Elvira Almonte PA-C    Location Instructions:     [facility] is located at [address] [place] [address], along [address] Free parking is available; access the lot by turning north from [address] onto [address], then west onto [address].              8/23/2023 12:30 PM NEW HEADACHE 60 min CS NEUROLOGY Cyn Parra PA-C    Location Instructions:     [address] [place] [address], [address] [place] [id number]                 Meme FRIEND RN   [facility] Triage

## 2023-06-12 NOTE — TELEPHONE ENCOUNTER
Nurse Triage SBAR    Is this a 2nd Level Triage? YES, LICENSED PRACTITIONER REVIEW IS REQUIRED    Situation: persistent headaches    Background: Saw Elvira on 5/10, prescribed topiramate 2 tablets daily and sumatriptan PRN.   Went to ER on 6/1. No relief at ER, headache did go away for a couple hours, then came back. Sees Neurology in August, but can't wait that long.     Assessment: Getting headaches every day. Always on left side.   Same headache pattern since office visit on 5/10.   Currently has mild headache.     Protocol Recommended Disposition:   See in Office Within 2 Weeks    Recommendation: Routing to provider to review and advise.     Ita Encinas RN  [place] Triage       Does the patient meet one of the following criteria for ADS visit consideration? 16+ years old, with an [facility] PCP     TIP  Providers, please consider if this condition is appropriate for management at one of our Acute and Diagnostic Services sites.     If patient is a good candidate, please use dotphrase <dot>triageresponse and select Refer to ADS to document.     Reason for Disposition    Headache is a chronic symptom (recurrent or ongoing AND lasting > 4 weeks)    Additional Information    Negative: Difficult to awaken or acting confused (e.g., disoriented, slurred speech)    Negative: Weakness of the face, arm or leg on one side of the body and new-onset    Negative: Numbness of the face, arm or leg on one side of the body and new-onset    Negative: Loss of speech or garbled speech and new-onset    Negative: Passed out (i.e., fainted, collapsed and was not responding)    Negative: Sounds like a life-threatening emergency to the triager    Negative: Followed a head injury within last 3 days    Negative: Traumatic Brain Injury (TBI) is suspected    Negative: Sinus pain of forehead and yellow or green nasal discharge    Negative: Pregnant    Negative: Unable to walk without falling    Negative: Stiff neck (can't touch chin to  chest)    Negative: Possibility of carbon monoxide exposure    Negative: SEVERE headache, states 'worst headache' of life    Negative: SEVERE headache, sudden-onset (i.e., reaching maximum intensity within seconds to 1 hour)    Negative: Severe pain in one eye    Negative: Loss of vision or double vision  (Exception: Same as prior migraines.)    Negative: Patient sounds very sick or weak to the triager    Negative: Fever > 103 F (39.4 C)    Negative: Fever > 100.0 F (37.8 C) and has diabetes mellitus or a weak immune system (e.g., HIV positive, cancer chemotherapy, organ transplant, splenectomy, chronic steroids)    Negative: SEVERE headache (e.g., excruciating) and has had severe headaches before    Negative: SEVERE headache and not relieved by pain meds    Negative: SEVERE headache and vomiting    Negative: SEVERE headache and fever    Protocols used: HEADACHE-A-OH

## 2023-06-13 ENCOUNTER — VIRTUAL VISIT (OUTPATIENT)
Dept: FAMILY MEDICINE | Facility: CLINIC | Age: 43
End: 2023-06-13
Payer: COMMERCIAL

## 2023-06-13 DIAGNOSIS — G44.011 INTRACTABLE EPISODIC CLUSTER HEADACHE: Primary | ICD-10-CM

## 2023-06-13 DIAGNOSIS — G43.909 MIGRAINE WITHOUT STATUS MIGRAINOSUS, NOT INTRACTABLE, UNSPECIFIED MIGRAINE TYPE: ICD-10-CM

## 2023-06-13 DIAGNOSIS — E55.9 HYPOVITAMINOSIS D: ICD-10-CM

## 2023-06-13 DIAGNOSIS — R51.9 DAILY HEADACHE: ICD-10-CM

## 2023-06-13 PROCEDURE — 99214 OFFICE O/P EST MOD 30 MIN: CPT | Mod: VID | Performed by: PHYSICIAN ASSISTANT

## 2023-06-13 RX ORDER — TOPIRAMATE 25 MG/1
TABLET, FILM COATED ORAL
Qty: 180 TABLET | Refills: 0 | Status: SHIPPED
Start: 2023-06-13 | End: 2023-06-28

## 2023-06-13 RX ORDER — PREDNISONE 10 MG/1
TABLET ORAL
Qty: 63 TABLET | Refills: 0 | Status: SHIPPED | OUTPATIENT
Start: 2023-06-13 | End: 2023-06-28

## 2023-06-13 RX ORDER — VERAPAMIL HYDROCHLORIDE 80 MG/1
80 TABLET ORAL 3 TIMES DAILY
Qty: 90 TABLET | Refills: 0 | Status: SHIPPED | OUTPATIENT
Start: 2023-06-13 | End: 2023-07-21

## 2023-06-13 NOTE — PROGRESS NOTES
Dariusz is a 43 year old who is being evaluated via a billable video visit.      How would you like to obtain your AVS? MyChart  If the video visit is dropped, the invitation should be resent by: Text to cell phone: [phone]  Will anyone else be joining your video visit? No        Assessment & Plan     Intractable episodic cluster headache  Symptoms appear consistent with cluster headache.  Taper/discontinue topiramate.   Due to longevity and severity of headaches will initiate preventative treatment in an effort to suppress cluster headaches.  Start prednisone taper x18 days and initiate verapamil 80 mg 3 times daily. Follow-up with neurology as scheduled tomorrow and inform them of our plan.  Also advised that portable oxygen may provide some symptomatic relief.  Close follow-up with patient on 6/28/2023 or sooner if symptoms necessitate  - verapamil (CALAN) 80 MG tablet  Dispense: 90 tablet; Refill: 0  - predniSONE (DELTASONE) 10 MG tablet  Dispense: 63 tablet; Refill: 0  - topiramate (TOPAMAX) 25 MG tablet  Dispense: 180 tablet; Refill: 0    Migraine without status migrainosus, not intractable, unspecified migraine type  Daily headache  No apparent improvement in symptoms.  Taper and discontinue topiramate  - topiramate (TOPAMAX) 25 MG tablet  Dispense: 180 tablet; Refill: 0    Hypovitaminosis D  Patient taking 50,000 IU once daily  - Vitamin D3 (CHOLECALCIFEROL) 125 MCG (5000 UT) tablet  Dispense: 100 tablet; Refill: 3         MED REC REQUIRED  Post Medication Reconciliation Status:  Discharge medications reconciled and changed, see notes/orders    Return in about 1 day (around 6/14/2023) for neurology.    Elvira Almonte PA-C  [facility]   Dariusz is a 43 year old, presenting for the following health issues:  ER F/U        6/13/2023     2:55 PM   Additional Questions   Roomed by Lucero Ro CMA   Accompanied by Self     HPI     ED/UC Followup:    Facility:    [facility]  Date of visit: 6/1/2023  Reason for visit: Headache: Cluster versus migraine syndrome  Current Status: Patient states he is still having almost daily headaches patient states headaches are on the left side in the eye.    See last office visit note dated 5/10/2023 for detailed history.    Best day was Saturday 6/10/2023 but he has been having significant headaches daily for the last month.  Has not taken Excedrin for the last month as it seems to be making it worse.  Did walk in to the neurology clinic in [place] because he was in such pain with his headache he was trying to see if he could get work done.  They were unable to work him and but he was advised to try at [facility], [facility] and [place].  He was able to get an appointment to be seen tomorrow at 8 AM.    Has been using topiramate 50 mg daily but does not think that his headache severity or frequency have improved since starting this.  Has been missing a lot of work because of these almost daily headaches.  Typically can only go 3 or 4 hours before having to leave work because of head pain.  Most of the pain is in the left side of his eye.    When inquiring about symptom response to the treatments administered in the hospital (1 L normal saline, dexamethasone 10 mg, diphenhydramine 25 mg, Toradol 15 mg, Reglan 10 mg, and oxygen) he reports no improvement, however, the ER documentation does state that he felt significantly better after this treatment regimen and strongly suspected cluster headaches as the origin for his recurrent headaches.    Hypovitaminosis D  Taking 50,000 units once weekly.  Pharmacy would not fill the 5000 units once daily as well.      Review of Systems   Constitutional, HEENT, cardiovascular, pulmonary, gi and gu systems are negative, except as otherwise noted.      Objective           Vitals:  No vitals were obtained today due to virtual visit.    Physical Exam   GENERAL:  Healthy, alert and no distress  EYES: Eyes grossly normal to inspection.  No discharge or erythema, or obvious scleral/conjunctival abnormalities.  HENT: Normal cephalic/atraumatic.  External ears, nose and mouth without ulcers or lesions.  No nasal drainage visible.  NECK: No asymmetry, visible masses or scars  RESP: No audible wheeze, cough, or visible cyanosis.  No visible retractions or increased work of breathing.    SKIN: Visible skin clear. No significant rash, abnormal pigmentation or lesions.  NEURO: Cranial nerves grossly intact.  Mentation and speech appropriate for age.  PSYCH: Mentation appears normal, affect normal/bright, judgement and insight intact, normal speech and appearance well-groomed.    Recent Results (from the past 744 hour(s))   MR Brain w/o & w Contrast    Narrative    EXAM: MR BRAIN W/O and W CONTRAST, MR CERVICAL SPINE W/O and W CONTRAST  LOCATION: [facility]  DATE/TIME: 5/27/2023 9:36 AM CDT    INDICATION: Headache; Chronic HA (>= 3 months); HA with clinical progression; No known automatically detected potential contraindications to MRI  COMPARISON: None.  CONTRAST: 10 mL Gadavist  TECHNIQUE:   1) Routine multiplanar multisequence head MRI without and with intravenous contrast.  2) Routine Cervical Spine MRI without and with IV contrast.    FINDINGS:  HEAD MRI:  INTRACRANIAL CONTENTS: No acute or subacute infarct. No mass, acute hemorrhage, or extra-axial fluid collections. Normal brain parenchymal signal. Left cerebellar developmental venous anomaly. No definite associated cavernous venous malformation or   abnormal parenchymal signal. No pathologic enhancement.    Normal ventricles and sulci. Normal position of the cerebellar tonsils.     SELLA: No abnormality accounting for technique.    OSSEOUS STRUCTURES/SOFT TISSUES: Normal marrow signal. The major intracranial vascular flow voids are maintained.     ORBITS: No abnormality accounting for technique.      SINUSES/MASTOIDS: No paranasal sinus mucosal disease. No middle ear or mastoid effusion.     CERVICAL SPINE:   Normal vertebral body heights, alignment and marrow signal. No abnormal cord signal. No extraspinal abnormality.    Craniovertebral junction and C1-C2: Normal.    C2-C3: Normal disc height. No herniation. Normal facets. No spinal canal or neural foraminal stenosis.     C3-C4: Normal disc height. No herniation. Normal facets. No spinal canal or neural foraminal stenosis.     C4-C5: Normal disc height. No herniation. Normal facets. No spinal canal or neural foraminal stenosis.     C5-C6: Normal disc height. No herniation. Normal facets. No spinal canal or neural foraminal stenosis.     C6-C7: Normal disc height. No herniation. Normal facets. No spinal canal or neural foraminal stenosis.     C7-T1: Normal disc height. No herniation. Normal facets. No spinal canal or neural foraminal stenosis.      Impression    IMPRESSION:  HEAD MRI:   1.  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  2.  No intracranial abnormality otherwise.    CERVICAL SPINE MRI:  1.  Normal MRI of the cervical spine.   MR Cervical Spine w/o & w Contrast    Narrative    EXAM: MR BRAIN W/O and W CONTRAST, MR CERVICAL SPINE W/O and W CONTRAST  LOCATION: [facility]  DATE/TIME: 5/27/2023 9:36 AM CDT    INDICATION: Headache; Chronic HA (>= 3 months); HA with clinical progression; No known automatically detected potential contraindications to MRI  COMPARISON: None.  CONTRAST: 10 mL Gadavist  TECHNIQUE:   1) Routine multiplanar multisequence head MRI without and with intravenous contrast.  2) Routine Cervical Spine MRI without and with IV contrast.    FINDINGS:  HEAD MRI:  INTRACRANIAL CONTENTS: No acute or subacute infarct. No mass, acute hemorrhage, or extra-axial fluid collections. Normal brain parenchymal signal. Left cerebellar  developmental venous anomaly. No definite associated cavernous venous malformation or   abnormal parenchymal signal. No pathologic enhancement.    Normal ventricles and sulci. Normal position of the cerebellar tonsils.     SELLA: No abnormality accounting for technique.    OSSEOUS STRUCTURES/SOFT TISSUES: Normal marrow signal. The major intracranial vascular flow voids are maintained.     ORBITS: No abnormality accounting for technique.     SINUSES/MASTOIDS: No paranasal sinus mucosal disease. No middle ear or mastoid effusion.     CERVICAL SPINE:   Normal vertebral body heights, alignment and marrow signal. No abnormal cord signal. No extraspinal abnormality.    Craniovertebral junction and C1-C2: Normal.    C2-C3: Normal disc height. No herniation. Normal facets. No spinal canal or neural foraminal stenosis.     C3-C4: Normal disc height. No herniation. Normal facets. No spinal canal or neural foraminal stenosis.     C4-C5: Normal disc height. No herniation. Normal facets. No spinal canal or neural foraminal stenosis.     C5-C6: Normal disc height. No herniation. Normal facets. No spinal canal or neural foraminal stenosis.     C6-C7: Normal disc height. No herniation. Normal facets. No spinal canal or neural foraminal stenosis.     C7-T1: Normal disc height. No herniation. Normal facets. No spinal canal or neural foraminal stenosis.      Impression    IMPRESSION:  HEAD MRI:   1.  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  2.  No intracranial abnormality otherwise.    CERVICAL SPINE MRI:  1.  Normal MRI of the cervical spine.                 Video-Visit Details    Type of service:  Video Visit     Originating Location (pt. Location): Home  Distant Location (provider location):  On-site  Platform used for Video Visit: Yue

## 2023-06-13 NOTE — TELEPHONE ENCOUNTER
Patient has appointment today-see other encounters.     Future Appointments 6/13/2023 - 12/10/2023      Date Visit Type Length Department Provider     6/13/2023  3:00 PM ED/HOSP FOLLOW UP 40 min RV [facility]ACT[facility] Elvira Almonte PA-C    Location Instructions:     [facility] is located at [address] [place] [address], along [address] Free parking is available; access the lot by turning north from [address] onto [address] [facility], then west onto [facility].              8/23/2023 12:30 PM NEW HEADACHE 60 min CS NEUROLOGY Cyn Parra PA-C    Location Instructions:     [address] [place] [address], [address] [facility] [phone]                 Meme FRIEND RN   [facility] Triage

## 2023-06-14 ENCOUNTER — MEDICAL CORRESPONDENCE (OUTPATIENT)
Dept: HEALTH INFORMATION MANAGEMENT | Facility: CLINIC | Age: 43
End: 2023-06-14
Payer: COMMERCIAL

## 2023-06-28 ENCOUNTER — VIRTUAL VISIT (OUTPATIENT)
Dept: FAMILY MEDICINE | Facility: CLINIC | Age: 43
End: 2023-06-28
Payer: COMMERCIAL

## 2023-06-28 DIAGNOSIS — G44.011 INTRACTABLE EPISODIC CLUSTER HEADACHE: Primary | ICD-10-CM

## 2023-06-28 DIAGNOSIS — G43.909 MIGRAINE WITHOUT STATUS MIGRAINOSUS, NOT INTRACTABLE, UNSPECIFIED MIGRAINE TYPE: ICD-10-CM

## 2023-06-28 DIAGNOSIS — R51.9 DAILY HEADACHE: ICD-10-CM

## 2023-06-28 DIAGNOSIS — Z51.81 ENCOUNTER FOR THERAPEUTIC DRUG MONITORING: ICD-10-CM

## 2023-06-28 PROCEDURE — 99214 OFFICE O/P EST MOD 30 MIN: CPT | Mod: VID | Performed by: PHYSICIAN ASSISTANT

## 2023-06-28 RX ORDER — CEFUROXIME AXETIL 250 MG/1
TABLET ORAL
COMMUNITY
Start: 2023-06-15

## 2023-06-28 RX ORDER — TOPIRAMATE 50 MG/1
50 TABLET, FILM COATED ORAL DAILY
Qty: 90 TABLET | Refills: 1 | Status: SHIPPED | OUTPATIENT
Start: 2023-06-28 | End: 2023-07-21

## 2023-06-28 NOTE — PROGRESS NOTES
Dariusz is a 43 year old who is being evaluated via a billable video visit.      How would you like to obtain your AVS? MyChart  If the video visit is dropped, the invitation should be resent by: Text to cell phone: [phone]  Will anyone else be joining your video visit? No          Assessment & Plan     Intractable episodic cluster headache  Migraine without status migrainosus, not intractable, unspecified migraine type  Daily headache  Encounter for therapeutic drug monitoring  Pt markedly improved with completion of prednisone taper and initiation of verapamil.  Even with symptom improvement pt very apprehensive to discontinue topiramate and requests continuation.  Does report very modest orthostasis/lightheadedness with standing since starting verapamil.  Recommend EKG and BP check in clinic in the next few weeks.  In the meantime, diligent hydration and taking fall precautions advised.   Pt encouraged to obtain arm BP cuff and keep BP diary when symptomatic.  Follow up with neurology in 3 months recommended and with me in 6 months or sooner if symptoms necessitate.    - topiramate (TOPAMAX) 50 MG tablet  Dispense: 90 tablet; Refill: 1  - EKG 12-lead complete w/read - Clinics        Review of prior external note(s) from - CareEverywhere information from \[facility]\"" [facility] reviewed      Return in about 16 days (around 7/14/2023) for BP Recheck, EKG.      Elvira Almonte MBA, MS, PA-C  [facility]   Dariusz is a 43 year old, presenting for the following health issues:  RECHECK        6/28/2023     3:21 PM   Additional Questions   Roomed by Ashley BEATRICE   Accompanied by Self     HPI     Migraine     Since your last clinic visit, how have your headaches changed?  Improved    How often are you getting headaches or migraines? 1 a week     Are you able to do normal daily activities when you have a migraine? Yes    Are you taking rescue/relief medications? (Select all that  apply) No    How helpful is your rescue/relief medication?  Has not had headache since new rx for imitrex injection    Are you taking any medications to prevent migraines? (Select all that apply)  Other: Imitrex injection    In the past 4 weeks, how often have you gone to urgent care or the emergency room because of your headaches?  0     Last virtual visit presumed persistent headaches were consistent with cluster headaches.  Started a long/strong prednisone taper x 18 days and intention to taper/discontinue topiramate.  Additionally was to start verapamil 80 mg TID.      When he tried to decrease topiramate from 50 mg to 25 mg his headaches worsened so he increased this back to 50 mg in addition to starting the prednisone taper and the verapamil.  within a week noted a substantial improvement in his symptoms.  He also saw neurology (note below) on 6/14/2023 with \[facility]\"" Clinic of neurology who agreed with the aforementioned treatment plan and added on Imitrex as an injection for abortive care.  Dariusz reported that his headaches have not surfaced in the last week so he has not had to utilize the imitrex in its injectable form.    Joaquin Bravo MD - 06/14/2023 8:00 AM CDT  Formatting of this note is different from the original.  CHIEF COMPLAINT:  Headaches    HISTORY OF PRESENT ILLNESS:  Thank you for referring Dariusz Teague who I saw at [facility] in consultation for headache. The headaches started since around 2021. It lasted about 7-8 months and then went away. It came back in 2/2023. Headache frequency is daily. He gets almost 5-6 attacks per day which lasts about 20-45 minutes each time. Pain scale is a 10 out of a 10. He describes the ice pick like pain like someone is drilling something in his left eye. Alleviating factors are lying down or being a dark quiet room. Sometimes ice pack or sit in a tub and take a cold bath. He has associated noise and light sensitivity with his  headaches. Sometimes has nausea or vomiting. Headaches are located to be on the left side and back of the head. Coughing,sneezing or having a bowel movement does not trigger a headache. Denies any blurry vision or Tinnitus. Denies any eye redness but has swelling and tearing. Pain sometimes radiates to the left neck and shoulder. Denies a history of head trauma. On review of outside records, the notes are consistent with the above symptoms. 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 Normal MRI cervical spine. His vitamin D level was 11, normal TSH, B12, ESR and CRP. His hemoglobin A1c was elevated at 5.7, cholesterol was 215.  Prior medications: Imitrex, Topamax    Migraine syndrome    Other orders  - ergocalciferol (VITAMIN D2) 1,250 mcg (50,000 unit) oral capsule; Take 1 capsule (50,000 Units) by mouth., Historical  - lidocaine (XYLOCAINE) 5 % Top Oint ointment; Historical  - predniSONE (DELTASONE) 10 mg oral tablet; Take by mouth., Historical  - topiramate (TOPAMAX) 25 mg oral tablet; Take 1 tablet (25 mg) by mouth daily for 7 days, THEN discontinue, Historical  - verapamiL (CALAN) 80 mg oral tablet; Take 1 tablet (80 mg) by mouth., Historical  - Sumatriptan Succinate 6 mg/0.5 mL SubQ; Inject 0.5 mL under the skin as directed. Can repeat in 2 hours if needed. 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 Normal MRI cervical spine.  -For prophylactic therapy will start Verapamil 80mg TID. Will I recommended EKG that he can have at his pcp office. Recommend Gammacore vagal nerve stimulation device but he would like to hold off at this time.   -For abortive therapy will start Sumatriptan 6mg Subcutaneous injection. He prefers not to use high flow oxygen because of the possible costs. He also was prescribed a course of  steroids from the ED so he can take that now.  -Recommended life style changes of regular meals, scheduled sleep and regular exercise of 40  minutes 3 times a week and staying well hydrated.  -Low Vitamin D: Continue 50,000IU per week.   -Overweight: Healthy BMI is between 18.5-24.9 Body mass index is 28.84 kg/m . Have counseled on good healthy diet and exercise.  -Follow up in 3 months.    Joaquin Bravo M.D.   [facility]      Review of Systems   Constitutional, HEENT, cardiovascular, pulmonary, gi and gu systems are negative, except as otherwise noted.      Objective           Vitals:  No vitals were obtained today due to virtual visit.    Physical Exam   GENERAL: Healthy, alert and no distress  EYES: Eyes grossly normal to inspection.  No discharge or erythema, or obvious scleral/conjunctival abnormalities.  RESP: No audible wheeze, cough, or visible cyanosis.  No visible retractions or increased work of breathing.    MS: No gross musculoskeletal defects noted.  Normal range of motion.  No visible edema.  SKIN: Visible skin clear. No significant rash, abnormal pigmentation or lesions.  NEURO: Cranial nerves grossly intact.  Mentation and speech appropriate for age.  PSYCH: Mentation appears normal, affect normal/bright, judgement and insight intact, normal speech and appearance well-groomed.    No results found for any visits on 06/28/23.            Video-Visit Details    Type of service:  Video Visit     Originating Location (pt. Location): Home    Distant Location (provider location):  On-site  Platform used for Video Visit: [employer]

## 2023-07-10 DIAGNOSIS — G44.011 INTRACTABLE EPISODIC CLUSTER HEADACHE: ICD-10-CM

## 2023-07-10 RX ORDER — VERAPAMIL HYDROCHLORIDE 80 MG/1
TABLET ORAL
Qty: 90 TABLET | Refills: 0 | OUTPATIENT
Start: 2023-07-10

## 2023-07-14 ENCOUNTER — ALLIED HEALTH/NURSE VISIT (OUTPATIENT)
Dept: NURSING | Facility: CLINIC | Age: 43
End: 2023-07-14
Payer: COMMERCIAL

## 2023-07-14 VITALS — DIASTOLIC BLOOD PRESSURE: 72 MMHG | OXYGEN SATURATION: 100 % | HEART RATE: 82 BPM | SYSTOLIC BLOOD PRESSURE: 108 MMHG

## 2023-07-14 DIAGNOSIS — R51.9 DAILY HEADACHE: ICD-10-CM

## 2023-07-14 DIAGNOSIS — Z01.30 BP CHECK: Primary | ICD-10-CM

## 2023-07-14 DIAGNOSIS — G44.011 INTRACTABLE EPISODIC CLUSTER HEADACHE: ICD-10-CM

## 2023-07-14 DIAGNOSIS — G43.909 MIGRAINE WITHOUT STATUS MIGRAINOSUS, NOT INTRACTABLE, UNSPECIFIED MIGRAINE TYPE: ICD-10-CM

## 2023-07-14 PROCEDURE — 99211 OFF/OP EST MAY X REQ PHY/QHP: CPT

## 2023-07-14 NOTE — PROGRESS NOTES
Dariusz Teague is being followed for Blood Pressure management.      BP Readings from Last 3 Encounters:   07/14/23 108/72   06/01/23 121/69   05/10/23 112/76       Wt Readings from Last 3 Encounters:   06/01/23 95.9 kg (211 lb 6.7 oz)   05/10/23 96.2 kg (212 lb)       Is pulse 55 or greater? - Yes    Pulse Readings from Last 3 Encounters:   07/14/23 82   06/01/23 72   05/10/23 96       Current blood pressure medication(s):  Current Outpatient Medications   Medication Sig Dispense Refill     SUMAtriptan (IMITREX STATDOSE) 6 MG/0.5ML pen injector kit INJECT 0.5ML UNDER THE SKIN AS DIRECTED. CAN REPEAT IN TWO HOURS IF NEEDED. NO MORE THAN TWO PER WEEK.       topiramate (TOPAMAX) 50 MG tablet Take 1 tablet (50 mg) by mouth daily 90 tablet 1     verapamil (CALAN) 80 MG tablet Take 1 tablet (80 mg) by mouth 3 times daily 90 tablet 0     vitamin D2 (ERGOCALCIFEROL) 81968 units (1250 mcg) capsule Take 1 capsule (50,000 Units) by mouth once a week 12 capsule 3     Vitamin D3 (CHOLECALCIFEROL) 125 MCG (5000 UT) tablet Take 1 tablet (125 mcg) by mouth daily 100 tablet 3          1. Follow up instructions include:     Per provider .      SUBJECTIVE:                                                    The patient is Not taking medication as prescribed and is tolerating well.   Pt is taking Verapamil only once daily and Topiramate only 25 mg daily for about the past 5 days. He reports he is not having any headaches.       Out of the following complicating factors: Cough, Headache, Lightheadedness, Shortness of breath, Fatigue, Nausea, Sexual Dysfunction, New onset of swelling or edema, Weakness and New onset of Chest Pain, the patient reports:  None - Pt states he occasionally has some tingling in his hand or feet, like they are asleep. He says he thinks it is improving. He also states he feels so much better than he has in a long time and hasn't had a headache in months.     OBJECTIVE:                                                       Today's BP completed using cuff size: regular on right side arm.      Potassium   Date Value Ref Range Status   05/10/2023 4.1 3.4 - 5.3 mmol/L Final     Creatinine   Date Value Ref Range Status   05/10/2023 1.17 0.67 - 1.17 mg/dL Final     Urea Nitrogen   Date Value Ref Range Status   05/10/2023 11.8 6.0 - 20.0 mg/dL Final     GFR Estimate   Date Value Ref Range Status   05/10/2023 79 >60 mL/min/1.73m2 Final     Comment:     eGFR calculated using 2021 CKD-EPI equation.       Advised patient would inform provider of medication being taken differently that prescribed and advise of her recommendations moving forward.     Karolina Anna RN

## 2023-07-17 NOTE — PROGRESS NOTES
OK with current medication use as long as symptoms are improved (verapamil for HA and not BP) - please update SIG in chart to reflect current medication use.    Follow up with neurology as planned      Elvira Almonte MBA, MS, PA-C  [facility]

## 2023-07-21 RX ORDER — TOPIRAMATE 50 MG/1
25 TABLET, FILM COATED ORAL DAILY
Qty: 90 TABLET | Refills: 1 | COMMUNITY
Start: 2023-07-21

## 2023-07-21 RX ORDER — VERAPAMIL HYDROCHLORIDE 80 MG/1
80 TABLET ORAL DAILY
Qty: 90 TABLET | Refills: 0 | COMMUNITY
Start: 2023-07-21 | End: 2023-10-16

## 2023-07-21 NOTE — PROGRESS NOTES
Med list updated.     NICKOLAS BROWN RN on 7/21/2023 at 9:52 AM   [facility]

## 2023-08-13 ENCOUNTER — HEALTH MAINTENANCE LETTER (OUTPATIENT)
Age: 43
End: 2023-08-13

## 2023-10-16 ENCOUNTER — VIRTUAL VISIT (OUTPATIENT)
Dept: FAMILY MEDICINE | Facility: CLINIC | Age: 43
End: 2023-10-16
Payer: COMMERCIAL

## 2023-10-16 DIAGNOSIS — G44.011 INTRACTABLE EPISODIC CLUSTER HEADACHE: ICD-10-CM

## 2023-10-16 PROCEDURE — 99213 OFFICE O/P EST LOW 20 MIN: CPT | Mod: VID | Performed by: PHYSICIAN ASSISTANT

## 2023-10-16 RX ORDER — VERAPAMIL HYDROCHLORIDE 80 MG/1
80 TABLET ORAL 3 TIMES DAILY
Qty: 270 TABLET | Refills: 0 | Status: SHIPPED | OUTPATIENT
Start: 2023-10-16 | End: 2024-01-15

## 2023-10-16 NOTE — PROGRESS NOTES
Dariusz is a 43 year old who is being evaluated via a billable video visit.      How would you like to obtain your AVS? MyChart  If the video visit is dropped, the invitation should be resent by: Text to cell phone: [phone]  Will anyone else be joining your video visit? No          Assessment & Plan     Dariusz was seen today for recheck medication.    Diagnoses and all orders for this visit:    Intractable episodic cluster headache  -     verapamil (CALAN) 80 MG tablet; Take 1 tablet (80 mg) by mouth 3 times daily    Reviewed multiple visit notes and extensive work-up done previously for migraine and cluster HA. Pt has ongoing care with neurology at end of the month, but ran out of his verapamil so is requesting refill until able to get back in with specialist. Reports previously had been stable on verapamil 80mg TID and topamax 50mg daily for prevention and keeps imitrex inj on hand for migraine . Refill provided and encouraged to continue care with specialist and PCP as planned. Patient in agreement with plan.       Flavia Mario PA-C  [facility]   Dariusz is a 43 year old, presenting for the following health issues:  Recheck Medication      10/16/2023     4:46 PM   Additional Questions   Roomed by Saroj   Accompanied by self         10/16/2023     4:46 PM   Patient Reported Additional Medications   Patient reports taking the following new medications no       History of Present Illness   Seen multiple times for cluster HA and migraine - see previous notes for details. Referred to [facility] and is supposed to have a follow-up end of this month. Reports unfortunately he ran out of verapamil and tried to decrease his dosage (due to running low) which triggered HA recurrence over the weekend. Tried request refill and reports by pharmacy he needed appt. Also took tylenol which he feels exacerbated the HA (reports had also been getting rebound  HA in past after taking 6-8x per day and has subsequently discontinued this practice as a result so feels tylenol may be a trigger for him now). Had more severe HA/migraine yesterday after running out of medication entirely. Used imitrex injection which helped with the headache, but made him feel fatigued. Requesting renewal of verapamil 80mg TID for prevention. Feels that combo of topamax 50mg plus verapamil 80mg TID works well for him. Previously also tried taper off topamax, but had recurrence of HA after going down to 25mg daily so they elected to keep him on 50mg daily.     He eats 0-1 servings of fruits and vegetables daily.He consumes 3 sweetened beverage(s) daily.He exercises with enough effort to increase his heart rate 9 or less minutes per day.  He exercises with enough effort to increase his heart rate 3 or less days per week.   He is taking medications regularly.       Review of Systems   Constitutional, HEENT, cardiovascular, pulmonary, gi and gu, neuro systems are negative, except as otherwise noted.      Objective           Vitals:  No vitals were obtained today due to virtual visit.    Physical Exam   GENERAL: Healthy, alert and no distress  EYES: Eyes grossly normal to inspection.  No discharge or erythema, or obvious scleral/conjunctival abnormalities.  RESP: No audible wheeze, cough, or visible cyanosis.  No visible retractions or increased work of breathing.    SKIN: Visible skin clear. No significant rash, abnormal pigmentation or lesions.  NEURO: Cranial nerves grossly intact.  Mentation and speech appropriate for age.  PSYCH: Mentation appears normal, affect normal/bright, judgement and insight intact, normal speech and appearance well-groomed.          Video-Visit Details    Type of service:  Video Visit     Originating Location (pt. Location): Home    Distant Location (provider location):  On-site  Platform used for Video Visit: [facility] [facility]

## 2023-12-31 ENCOUNTER — HEALTH MAINTENANCE LETTER (OUTPATIENT)
Age: 43
End: 2023-12-31

## 2024-01-15 DIAGNOSIS — G44.011 INTRACTABLE EPISODIC CLUSTER HEADACHE: ICD-10-CM

## 2024-01-15 RX ORDER — VERAPAMIL HYDROCHLORIDE 80 MG/1
TABLET ORAL
Qty: 270 TABLET | Refills: 0 | Status: SHIPPED | OUTPATIENT
Start: 2024-01-15

## 2024-05-19 ENCOUNTER — HEALTH MAINTENANCE LETTER (OUTPATIENT)
Age: 44
End: 2024-05-19

## 2024-07-28 ENCOUNTER — HEALTH MAINTENANCE LETTER (OUTPATIENT)
Age: 44
End: 2024-07-28

## 2024-08-13 ENCOUNTER — OFFICE VISIT (OUTPATIENT)
Dept: FAMILY MEDICINE | Facility: CLINIC | Age: 44
End: 2024-08-13
Payer: COMMERCIAL

## 2024-08-13 ENCOUNTER — NURSE TRIAGE (OUTPATIENT)
Dept: FAMILY MEDICINE | Facility: CLINIC | Age: 44
End: 2024-08-13

## 2024-08-13 VITALS
BODY MASS INDEX: 31.44 KG/M2 | HEIGHT: 70 IN | OXYGEN SATURATION: 99 % | SYSTOLIC BLOOD PRESSURE: 134 MMHG | RESPIRATION RATE: 16 BRPM | HEART RATE: 122 BPM | DIASTOLIC BLOOD PRESSURE: 70 MMHG | WEIGHT: 219.6 LBS | TEMPERATURE: 101.7 F

## 2024-08-13 DIAGNOSIS — J02.0 STREPTOCOCCAL PHARYNGITIS: Primary | ICD-10-CM

## 2024-08-13 DIAGNOSIS — J02.9 SORE THROAT: ICD-10-CM

## 2024-08-13 LAB — DEPRECATED S PYO AG THROAT QL EIA: POSITIVE

## 2024-08-13 PROCEDURE — 99214 OFFICE O/P EST MOD 30 MIN: CPT | Performed by: PHYSICIAN ASSISTANT

## 2024-08-13 PROCEDURE — 87880 STREP A ASSAY W/OPTIC: CPT | Performed by: PHYSICIAN ASSISTANT

## 2024-08-13 RX ORDER — IBUPROFEN 200 MG
800 TABLET ORAL EVERY 8 HOURS
COMMUNITY
Start: 2024-08-13 | End: 2024-08-21

## 2024-08-13 RX ORDER — ACETAMINOPHEN 500 MG
1000 TABLET ORAL EVERY 8 HOURS
COMMUNITY
Start: 2024-08-13

## 2024-08-13 NOTE — LETTER
SM[facility] [address] [id number] [phone]       August 13, 2024    Arzola Ho  [address]   [place] [address]    To Whom it May Concern:    The above patient is unable to attend work for 8/13/2024-8/17/2024 due to a medical issue. Please contact me with questions or concerns.      Sincerely,    Elvira Almonte MBA, MS, PA-C  [facility]

## 2024-08-13 NOTE — TELEPHONE ENCOUNTER
I can see in my HAILEY spot      Elvira Almonte MBA, MS, PAPIEDAD  [facility]

## 2024-08-13 NOTE — RESULT ENCOUNTER NOTE
Results discussed directly with patient while patient was present. Any further details documented in the note.   Elvira Almonte PA-C

## 2024-08-13 NOTE — PROGRESS NOTES
"  Assessment & Plan     Streptococcal pharyngitis  Sore throat  Symptomatic sore throat w/fever.  Recommend starting Augmentin and scheduled alternating tylenol/ibuprofen for pain control and antipyretic effects.  Throw toothbrush on day 5 of treatment.  Advised of warning signs/when to seek further evaluation.  Supportive cares also advised - diligent hydration efforts especially in light of febile illness and pain with food ingestion.    - amoxicillin-clavulanate (AUGMENTIN) 875-125 MG tablet  Dispense: 20 tablet; Refill: 0  - acetaminophen (TYLENOL) 500 MG tablet  - ibuprofen (ADVIL/MOTRIN) 200 MG tablet  - Streptococcus A Rapid Screen w/Reflex to PCR - Clinic Collect      BMI  Estimated body mass index is 31.51 kg/m  as calculated from the following:    Height as of this encounter: 1.778 m (5' 10\").    Weight as of this encounter: 99.6 kg (219 lb 9.6 oz).   Weight management plan: Patient was referred to their PCP to discuss a diet and exercise plan.      Return in about 2 months (around 10/13/2024) for Physical Exam, Fasting labs, Medication recheck.       Elvira Almonte MBA, MS, PA-C  [facility]      Krupa Arzola is a 44 year old, presenting for the following health issues:  URI        8/13/2024     2:52 PM   Additional Questions   Roomed by Ashley BARRON   Accompanied by Self     History of Present Illness       Reason for visit:  Feeling sick everywhere  Symptom onset:  1-3 days ago  Symptom intensity:  Severe  Symptom progression:  Worsening  Had these symptoms before:  No  What makes it worse:  Swallowing  What makes it better:  Nothing    He eats 0-1 servings of fruits and vegetables daily.He consumes 1 sweetened beverage(s) daily.He exercises with enough effort to increase his heart rate 10 to 19 minutes per day.  He exercises with enough effort to increase his heart rate 3 or less days per week.   He is taking medications regularly.       Acute Illness  Acute illness concerns: " "Sore throat  Onset/Duration: 8/12/2024  Symptoms:  Fever: YES  Chills/Sweats: YES  Headache (location?): YES  Sinus Pressure: No  Conjunctivitis:  No  Ear Pain: YES: right  Rhinorrhea: No  Congestion: No  Sore Throat: YES  Cough: no  Wheeze: No  Decreased Appetite: YES  Nausea: YES  Vomiting: No  Diarrhea: No  Dysuria/Freq.: No  Dysuria or Hematuria: No  Fatigue/Achiness: YES  Sick/Strep Exposure: YES- son  Therapies tried and outcome: Tylenol last taken at 10:00 AM        Review of Systems  Constitutional, HEENT, cardiovascular, pulmonary, GI, , musculoskeletal, neuro, skin, endocrine and psych systems are negative, except as otherwise noted.      Objective    /70   Pulse (!) 122   Temp (!) 101.7  F (38.7  C) (Tympanic)   Resp 16   Ht 1.778 m (5' 10\")   Wt 99.6 kg (219 lb 9.6 oz)   SpO2 99%   BMI 31.51 kg/m    Body mass index is 31.51 kg/m .  Physical Exam   GENERAL: alert and no distress  EYES: Eyes grossly normal to inspection, PERRL and conjunctivae and sclerae normal  HENT: normal cephalic/atraumatic, ear canals and TM's normal, oral mucous membranes moist, tonsillar hypertrophy, tonsillar erythema, and tonsillar exudate  NECK: no adenopathy, no asymmetry, masses, or scars  RESP: lungs clear to auscultation - no rales, rhonchi or wheezes  CV: regular rate and rhythm, normal S1 S2, no S3 or S4, no murmur, click or rub, no peripheral edema  MS: no gross musculoskeletal defects noted, no edema  SKIN: no suspicious lesions or rashes  NEURO: Normal strength and tone, mentation intact and speech normal  PSYCH: mentation appears normal, affect normal/bright    Results for orders placed or performed in visit on 08/13/24   Streptococcus A Rapid Screen w/Reflex to PCR - Clinic Collect     Status: Abnormal    Specimen: Throat; Swab   Result Value Ref Range    Group A Strep antigen Positive (A) Negative           Signed Electronically by: Elvira Almonte PA-C    "

## 2024-08-13 NOTE — TELEPHONE ENCOUNTER
Nurse Triage SBAR    Is this a 2nd Level Triage? YES, LICENSED PRACTITIONER REVIEW IS REQUIRED    Situation: patient complaining of sore throat     Background: patient reports child was seen for strep throat over the weekend and had tested positive for strep     Assessment: Patient reports symptoms started 3 days ago. Having sore throaat, fever 101 degrees, fatigue, muscle aches, and headache. Tested negative for COVID. Denies diarrhea and vomiting. Patient reports hurts to swallow but no difficulty breathing.     Protocol Recommended Disposition:   See in Office Today    Recommendation: No available appointment, can POA be used today for patient to be assessed?      Routed to provider    Does the patient meet one of the following criteria for ADS visit consideration? 16+ years old, with an [facility] PCP     TIP  Providers, please consider if this condition is appropriate for management at one of our Acute and Diagnostic Services sites.     If patient is a good candidate, please use dotphrase <dot>triageresponse and select Refer to ADS to document.  Reason for Disposition   SEVERE sore throat pain    Additional Information   Negative: SEVERE difficulty breathing (e.g., struggling for each breath, speaks in single words)   Negative: Sounds like a life-threatening emergency to the triager   Negative: Drooling or spitting out saliva (because can't swallow)   Negative: Unable to open mouth completely   Negative: Drinking very little and has signs of dehydration (e.g., no urine > 12 hours, very dry mouth, very lightheaded)   Negative: Patient sounds very sick or weak to the triager   Negative: Difficulty breathing (per caller) but not severe   Negative: Fever > 103 F (39.4 C)   Negative: Refuses to drink anything for > 12 hours    Protocols used: Sore Throat-A-OH

## 2024-11-11 SDOH — HEALTH STABILITY: PHYSICAL HEALTH: ON AVERAGE, HOW MANY DAYS PER WEEK DO YOU ENGAGE IN MODERATE TO STRENUOUS EXERCISE (LIKE A BRISK WALK)?: 2 DAYS

## 2024-11-11 SDOH — HEALTH STABILITY: PHYSICAL HEALTH: ON AVERAGE, HOW MANY MINUTES DO YOU ENGAGE IN EXERCISE AT THIS LEVEL?: 10 MIN

## 2024-11-11 ASSESSMENT — SOCIAL DETERMINANTS OF HEALTH (SDOH): HOW OFTEN DO YOU GET TOGETHER WITH FRIENDS OR RELATIVES?: ONCE A WEEK

## 2024-11-12 ENCOUNTER — OFFICE VISIT (OUTPATIENT)
Dept: FAMILY MEDICINE | Facility: CLINIC | Age: 44
End: 2024-11-12
Payer: COMMERCIAL

## 2024-11-12 VITALS
OXYGEN SATURATION: 100 % | RESPIRATION RATE: 12 BRPM | HEART RATE: 73 BPM | HEIGHT: 70 IN | BODY MASS INDEX: 31.64 KG/M2 | SYSTOLIC BLOOD PRESSURE: 116 MMHG | WEIGHT: 221 LBS | TEMPERATURE: 97.9 F | DIASTOLIC BLOOD PRESSURE: 68 MMHG

## 2024-11-12 DIAGNOSIS — Z23 NEED FOR TDAP VACCINATION: ICD-10-CM

## 2024-11-12 DIAGNOSIS — Z23 NEED FOR PROPHYLACTIC VACCINATION AND INOCULATION AGAINST INFLUENZA: ICD-10-CM

## 2024-11-12 DIAGNOSIS — Z13.220 SCREENING FOR HYPERLIPIDEMIA: ICD-10-CM

## 2024-11-12 DIAGNOSIS — Z12.5 SCREENING FOR PROSTATE CANCER: ICD-10-CM

## 2024-11-12 DIAGNOSIS — E55.9 HYPOVITAMINOSIS D: ICD-10-CM

## 2024-11-12 DIAGNOSIS — Z00.00 ROUTINE GENERAL MEDICAL EXAMINATION AT A HEALTH CARE FACILITY: Primary | ICD-10-CM

## 2024-11-12 DIAGNOSIS — R73.03 PREDIABETES: ICD-10-CM

## 2024-11-12 LAB
ALBUMIN SERPL BCG-MCNC: 4.4 G/DL (ref 3.5–5.2)
ALP SERPL-CCNC: 80 U/L (ref 40–150)
ALT SERPL W P-5'-P-CCNC: 17 U/L (ref 0–70)
ANION GAP SERPL CALCULATED.3IONS-SCNC: 11 MMOL/L (ref 7–15)
AST SERPL W P-5'-P-CCNC: 26 U/L (ref 0–45)
BILIRUB SERPL-MCNC: 0.3 MG/DL
BUN SERPL-MCNC: 10.4 MG/DL (ref 6–20)
CALCIUM SERPL-MCNC: 9.6 MG/DL (ref 8.8–10.4)
CHLORIDE SERPL-SCNC: 107 MMOL/L (ref 98–107)
CHOLEST SERPL-MCNC: 207 MG/DL
CREAT SERPL-MCNC: 1 MG/DL (ref 0.67–1.17)
EGFRCR SERPLBLD CKD-EPI 2021: >90 ML/MIN/1.73M2
EST. AVERAGE GLUCOSE BLD GHB EST-MCNC: 105 MG/DL
FASTING STATUS PATIENT QL REPORTED: NO
FASTING STATUS PATIENT QL REPORTED: NO
GLUCOSE SERPL-MCNC: 85 MG/DL (ref 70–99)
HBA1C MFR BLD: 5.3 % (ref 0–5.6)
HCO3 SERPL-SCNC: 25 MMOL/L (ref 22–29)
HDLC SERPL-MCNC: 36 MG/DL
LDLC SERPL CALC-MCNC: 135 MG/DL
NONHDLC SERPL-MCNC: 171 MG/DL
POTASSIUM SERPL-SCNC: 4 MMOL/L (ref 3.4–5.3)
PROT SERPL-MCNC: 7.3 G/DL (ref 6.4–8.3)
PSA SERPL DL<=0.01 NG/ML-MCNC: 0.53 NG/ML (ref 0–2.5)
SODIUM SERPL-SCNC: 143 MMOL/L (ref 135–145)
TRIGL SERPL-MCNC: 180 MG/DL
VIT D+METAB SERPL-MCNC: 19 NG/ML (ref 20–50)

## 2024-11-12 PROCEDURE — 90656 IIV3 VACC NO PRSV 0.5 ML IM: CPT | Performed by: FAMILY MEDICINE

## 2024-11-12 PROCEDURE — 36415 COLL VENOUS BLD VENIPUNCTURE: CPT | Performed by: FAMILY MEDICINE

## 2024-11-12 PROCEDURE — 80061 LIPID PANEL: CPT | Performed by: FAMILY MEDICINE

## 2024-11-12 PROCEDURE — 90472 IMMUNIZATION ADMIN EACH ADD: CPT | Performed by: FAMILY MEDICINE

## 2024-11-12 PROCEDURE — G0103 PSA SCREENING: HCPCS | Performed by: FAMILY MEDICINE

## 2024-11-12 PROCEDURE — 80053 COMPREHEN METABOLIC PANEL: CPT | Performed by: FAMILY MEDICINE

## 2024-11-12 PROCEDURE — 99396 PREV VISIT EST AGE 40-64: CPT | Mod: 25 | Performed by: FAMILY MEDICINE

## 2024-11-12 PROCEDURE — 90715 TDAP VACCINE 7 YRS/> IM: CPT | Performed by: FAMILY MEDICINE

## 2024-11-12 PROCEDURE — 90471 IMMUNIZATION ADMIN: CPT | Performed by: FAMILY MEDICINE

## 2024-11-12 PROCEDURE — 82306 VITAMIN D 25 HYDROXY: CPT | Performed by: FAMILY MEDICINE

## 2024-11-12 PROCEDURE — 83036 HEMOGLOBIN GLYCOSYLATED A1C: CPT | Performed by: FAMILY MEDICINE

## 2024-11-12 ASSESSMENT — PAIN SCALES - GENERAL: PAINLEVEL_OUTOF10: NO PAIN (0)

## 2024-11-12 NOTE — PATIENT INSTRUCTIONS
Patient Education   Preventive Care Advice   This is general advice given by our system to help you stay healthy. However, your care team may have specific advice just for you. Please talk to your care team about your preventive care needs.  Nutrition  Eat 5 or more servings of fruits and vegetables each day.  Try wheat bread, brown rice and whole grain pasta (instead of white bread, rice, and pasta).  Get enough calcium and vitamin D. Check the label on foods and aim for 100% of the RDA (recommended daily allowance).  Lifestyle  Exercise at least 150 minutes each week  (30 minutes a day, 5 days a week).  Do muscle strengthening activities 2 days a week. These help control your weight and prevent disease.  No smoking.  Wear sunscreen to prevent skin cancer.  Have a dental exam and cleaning every 6 months.  Yearly exams  See your health care team every year to talk about:  Any changes in your health.  Any medicines your care team has prescribed.  Preventive care, family planning, and ways to prevent chronic diseases.  Shots (vaccines)   HPV shots (up to age 26), if you've never had them before.  Hepatitis B shots (up to age 59), if you've never had them before.  COVID-19 shot: Get this shot when it's due.  Flu shot: Get a flu shot every year.  Tetanus shot: Get a tetanus shot every 10 years.  Pneumococcal, hepatitis A, and RSV shots: Ask your care team if you need these based on your risk.  Shingles shot (for age 50 and up)  General health tests  Diabetes screening:  Starting at age 35, Get screened for diabetes at least every 3 years.  If you are younger than age 35, ask your care team if you should be screened for diabetes.  Cholesterol test: At age 39, start having a cholesterol test every 5 years, or more often if advised.  Bone density scan (DEXA): At age 50, ask your care team if you should have this scan for osteoporosis (brittle bones).  Hepatitis C: Get tested at least once in your life.  STIs (sexually  transmitted infections)  Before age 24: Ask your care team if you should be screened for STIs.  After age 24: Get screened for STIs if you're at risk. You are at risk for STIs (including HIV) if:  You are sexually active with more than one person.  You don't use condoms every time.  You or a partner was diagnosed with a sexually transmitted infection.  If you are at risk for HIV, ask about PrEP medicine to prevent HIV.  Get tested for HIV at least once in your life, whether you are at risk for HIV or not.  Cancer screening tests  Cervical cancer screening: If you have a cervix, begin getting regular cervical cancer screening tests starting at age 21.  Breast cancer scan (mammogram): If you've ever had breasts, begin having regular mammograms starting at age 40. This is a scan to check for breast cancer.  Colon cancer screening: It is important to start screening for colon cancer at age 45.  Have a colonoscopy test every 10 years (or more often if you're at risk) Or, ask your provider about stool tests like a FIT test every year or Cologuard test every 3 years.  To learn more about your testing options, visit:   .  For help making a decision, visit:   https://bit.ly/xz96216.  Prostate cancer screening test: If you have a prostate, ask your care team if a prostate cancer screening test (PSA) at age 55 is right for you.  Lung cancer screening: If you are a current or former smoker ages 50 to 80, ask your care team if ongoing lung cancer screenings are right for you.  For informational purposes only. Not to replace the advice of your health care provider. Copyright   2023 [facility]. All rights reserved. Clinically reviewed by the [facility] Transitions Program. [employer] 083465 - REV 01/24.     Patient Education   Preventive Care Advice   This is general advice given by our system to help you stay healthy. However, your care team may have specific advice just for you. Please talk to your care team  about your preventive care needs.  Nutrition  Eat 5 or more servings of fruits and vegetables each day.  Try wheat bread, brown rice and whole grain pasta (instead of white bread, rice, and pasta).  Get enough calcium and vitamin D. Check the label on foods and aim for 100% of the RDA (recommended daily allowance).  Lifestyle  Exercise at least 150 minutes each week  (30 minutes a day, 5 days a week).  Do muscle strengthening activities 2 days a week. These help control your weight and prevent disease.  No smoking.  Wear sunscreen to prevent skin cancer.  Have a dental exam and cleaning every 6 months.  Yearly exams  See your health care team every year to talk about:  Any changes in your health.  Any medicines your care team has prescribed.  Preventive care, family planning, and ways to prevent chronic diseases.  Shots (vaccines)   HPV shots (up to age 26), if you've never had them before.  Hepatitis B shots (up to age 59), if you've never had them before.  COVID-19 shot: Get this shot when it's due.  Flu shot: Get a flu shot every year.  Tetanus shot: Get a tetanus shot every 10 years.  Pneumococcal, hepatitis A, and RSV shots: Ask your care team if you need these based on your risk.  Shingles shot (for age 50 and up)  General health tests  Diabetes screening:  Starting at age 35, Get screened for diabetes at least every 3 years.  If you are younger than age 35, ask your care team if you should be screened for diabetes.  Cholesterol test: At age 39, start having a cholesterol test every 5 years, or more often if advised.  Bone density scan (DEXA): At age 50, ask your care team if you should have this scan for osteoporosis (brittle bones).  Hepatitis C: Get tested at least once in your life.  STIs (sexually transmitted infections)  Before age 24: Ask your care team if you should be screened for STIs.  After age 24: Get screened for STIs if you're at risk. You are at risk for STIs (including HIV) if:  You are  sexually active with more than one person.  You don't use condoms every time.  You or a partner was diagnosed with a sexually transmitted infection.  If you are at risk for HIV, ask about PrEP medicine to prevent HIV.  Get tested for HIV at least once in your life, whether you are at risk for HIV or not.  Cancer screening tests  Cervical cancer screening: If you have a cervix, begin getting regular cervical cancer screening tests starting at age 21.  Breast cancer scan (mammogram): If you've ever had breasts, begin having regular mammograms starting at age 40. This is a scan to check for breast cancer.  Colon cancer screening: It is important to start screening for colon cancer at age 45.  Have a colonoscopy test every 10 years (or more often if you're at risk) Or, ask your provider about stool tests like a FIT test every year or Cologuard test every 3 years.  To learn more about your testing options, visit:   .  For help making a decision, visit:   https://bit.ly/yi99643.  Prostate cancer screening test: If you have a prostate, ask your care team if a prostate cancer screening test (PSA) at age 55 is right for you.  Lung cancer screening: If you are a current or former smoker ages 50 to 80, ask your care team if ongoing lung cancer screenings are right for you.  For informational purposes only. Not to replace the advice of your health care provider. Copyright   2023 [facility]. All rights reserved. Clinically reviewed by the [facility] Transitions Program. [employer] 708513 - REV 01/24.      Check out LodgeoPal or LoseIt Apps to help track calories.        - CeraVe (cream works better than lotion)  - Cetaphil Restoraderm   - Eucerin Professional Repair  - Curel Intensive Healing Cream

## 2024-11-12 NOTE — PROGRESS NOTES
Preventive Care Visit  [facility] [place] [place]  Denis Oh DO, Family Medicine  Nov 12, 2024    Assessment & Plan     Routine general medical examination at a health care facility  Health maintenance reviewed and updated. Emphasized importance of balanced diet and regular exercise.    Hypovitaminosis D  - Vitamin D Deficiency; Future    Prediabetes  - Comprehensive metabolic panel (BMP + Alb, Alk Phos, ALT, AST, Total. Bili, TP); Future  - Hemoglobin A1c; Future    Screening for hyperlipidemia  - Lipid panel reflex to direct LDL Fasting; Future    Screening for prostate cancer  - PSA, screen; Future    Need for prophylactic vaccination and inoculation against influenza    Need for Tdap vaccination  - TDAP 7+ (ADACEL,BOOSTRIX)    Patient has been advised of split billing requirements and indicates understanding: Yes        Counseling  Appropriate preventive services were addressed with this patient via screening, questionnaire, or discussion as appropriate for fall prevention, nutrition, physical activity, Tobacco-use cessation, social engagement, weight loss and cognition.  Checklist reviewing preventive services available has been given to the patient.  Reviewed patient's diet, addressing concerns and/or questions.   He is at risk for lack of exercise and has been provided with information to increase physical activity for the benefit of his well-being.   The patient was instructed to see the dentist every 6 months.     Krupa Arzola is a 44 year old, presenting for the following:  Physical and Imm/Inj (Flu Shot)        11/12/2024     9:38 AM   Additional Questions   Roomed by DON Otto CMA   Accompanied by SELF        HPI    Health Care Directive  Patient does not have a Health Care Directive      11/11/2024   General Health   How would you rate your overall physical health? Good   Feel stress (tense, anxious, or unable to sleep) Not at all            11/11/2024   Nutrition   Three or more  servings of calcium each day? Yes   Diet: Regular (no restrictions)   How many servings of fruit and vegetables per day? (!) 0-1   How many sweetened beverages each day? (!) 2            11/11/2024   Exercise   Days per week of moderate/strenous exercise 2 days   Average minutes spent exercising at this level 10 min            11/11/2024   Social Factors   Frequency of gathering with friends or relatives Once a week   Worry food won't last until get money to buy more No   Food not last or not have enough money for food? No   Do you have housing? (Housing is defined as stable permanent housing and does not include staying ouside in a car, in a tent, in an abandoned building, in an overnight shelter, or couch-surfing.) Yes   Are you worried about losing your housing? No   Lack of transportation? No   Unable to get utilities (heat,electricity)? No            11/11/2024   Dental   Dentist two times every year? (!) NO          11/11/2024   TB Screening   Were you born outside of the [place]? Yes            11/11/2024   Substance Use   Alcohol more than 3/day or more than 7/wk Not Applicable   Do you use any other substances recreationally? No        Social History     Tobacco Use    Smoking status: Never     Passive exposure: Never    Smokeless tobacco: Never   Vaping Use    Vaping status: Never Used   Substance Use Topics    Alcohol use: Never    Drug use: Never           11/11/2024   STI Screening   New sexual partner(s) since last STI/HIV test? No      ASCVD Risk   The 10-year ASCVD risk score (Ester FOOTE, et al., 2019) is: 3.6%    Values used to calculate the score:      Age: 44 years      Sex: Male      Is Non- : Yes      Diabetic: No      Tobacco smoker: No      Systolic Blood Pressure: 116 mmHg      Is BP treated: No      HDL Cholesterol: 40 mg/dL      Total Cholesterol: 215 mg/dL        11/11/2024   Contraception/Family Planning   Questions about contraception or family planning No     "  Reviewed and updated as needed this visit by Provider       Med Hx  Surg Hx    Sexual Activity          Past Medical History:   Diagnosis Date    Cluster versus migraine headache syndrome        History reviewed. No pertinent surgical history.      Review of Systems  Constitutional, HEENT, cardiovascular, pulmonary, gi and gu systems are negative, except as otherwise noted.     Objective    Exam  /68   Pulse 73   Temp 97.9  F (36.6  C) (Tympanic)   Resp 12   Ht 1.778 m (5' 10\")   Wt 100.2 kg (221 lb)   SpO2 100%   BMI 31.71 kg/m     Estimated body mass index is 31.71 kg/m  as calculated from the following:    Height as of this encounter: 1.778 m (5' 10\").    Weight as of this encounter: 100.2 kg (221 lb).    Physical Exam  GENERAL: alert and no distress  EYES: Eyes grossly normal to inspection   HENT: ear canals and TM's normal, nose and mouth without ulcers or lesions  NECK: no adenopathy, no asymmetry, masses, or scars  RESP: lungs clear to auscultation - no rales, rhonchi or wheezes  CV: regular rate and rhythm, normal S1 S2, no S3 or S4, no murmur, click or rub, no peripheral edema  ABDOMEN: soft, nontender, no hepatosplenomegaly, no masses and bowel sounds normal  MS: no gross musculoskeletal defects noted, no edema  SKIN: no suspicious lesions or rashes  PSYCH: mentation appears normal, affect normal/bright        Signed Electronically by: Denis Oh DO  "

## 2025-04-02 ENCOUNTER — NURSE TRIAGE (OUTPATIENT)
Dept: FAMILY MEDICINE | Facility: CLINIC | Age: 45
End: 2025-04-02
Payer: COMMERCIAL

## 2025-04-02 NOTE — CONFIDENTIAL NOTE
Attempt # 1    Called # [phone]     Left a non detailed VM to call back at [phone] and ask for any available Triage Nurse.    Karolina Anna RN  [facility]

## 2025-04-02 NOTE — TELEPHONE ENCOUNTER
2nd level triage -scheduled for in person tomorrow.     Routing to  provider given pcp is out of office    Otherwise   Situation   Called patient to get more information given meds were discontinued in November.   Requesting refills to control migraines     Background   Verapamil, Topiramate was discontinued 11/2024-  previous took the medications for migraine control.     Assessment.   Migraines are back   Mild to severe Migraine for 3 weeks straight     Same location as migraines in the past- Left side of head  Light and noise sensitivity    Mild nausea at times.   Migraines varies from mild to severe     While on the medications sometimes migraines would be 1-2 days a month   Sometimes migraine would last a few hours to a few days, varying in intensity.   Patient states when on the mediation the frequency and intensity of migraines was better.     Migraine headache is starting to effect his work.     Patient states he has been taking Tylenol     Recommendations   1000mg every 6- 8 hours, 4000 mg max in 24 hours  Rescheduled 4/24 apt to tomorrow 4/3 explained location, arrival and apt time   Future Appointments 4/2/2025 - 9/29/2025        Date Visit Type Length Department Provider     4/3/2025  4:30 PM OFFICE VISIT 30 min  FAMILY PRACTICE  Arrive at:  FAMILY PRACTICE Lakeshia Mack, [place]    Location Instructions:     [facility] is located at [address] [place] [address], along [address] Free parking is available; access the lot by turning north from [address] onto [address], then west onto [address].                     Reason for Disposition   Patient wants to be seen   Similar to previously diagnosed migraine headaches   MILD - MODERATE headache present > 3 days (72 hours)    Additional Information   Negative: Difficult to awaken or acting confused (e.g., disoriented, slurred speech)   Negative: Weakness of the face, arm or leg on one side of the body and new-onset    Negative: Numbness of the face, arm or leg on one side of the body and new-onset   Negative: Loss of speech or garbled speech and new-onset   Negative: Passed out (e.g., fainted, lost consciousness, blacked out and was not responding)   Negative: Sounds like a life-threatening emergency to the triager   Negative: Followed a head injury within last 3 days   Negative: Traumatic Brain Injury (TBI) is suspected   Negative: Sinus pain or congestion is main symptom(s)   Negative: Influenza suspected (i.e., cough, fever, other respiratory symptoms; probable influenza exposure)   Negative: Pregnant   Negative: Unable to walk without falling   Negative: Stiff neck (can't touch chin to chest)   Negative: Other family members (or people in same household) with headaches and possibility of carbon monoxide exposure   Negative: SEVERE headache, states 'worst headache' of life   Negative: SEVERE headache, sudden-onset (i.e., reaching maximum intensity within seconds to 1 hour)   Negative: Severe pain in one eye   Negative: Loss of vision or double vision (Exception: Same as previously diagnosed migraines.)   Negative: Patient sounds very sick or weak to the triager   Negative: Fever > 103 F (39.4 C)   Negative: Fever > 100 F (37.8 C) and has diabetes mellitus or a weak immune system (e.g., HIV positive, cancer chemotherapy, organ transplant, splenectomy, chronic steroids)   Negative: SEVERE headache (e.g., excruciating) and has had severe headaches before   Negative: SEVERE headache and not relieved by pain meds   Negative: SEVERE headache and vomiting   Negative: SEVERE headache and fever   Negative: New-onset headache and weak immune system (e.g., HIV positive, cancer chemo, splenectomy, organ transplant, chronic steroids)   Negative: Fever present > 3 days (72 hours)   Negative: MODERATE headache (e.g., interferes with normal activities) present > 24 hours and unexplained   Negative: New-onset headache and age > 50 years    Negative: Headache started during exertion (e.g., sex, strenuous exercise, heavy lifting)   Negative: Headache is a chronic symptom (recurrent or ongoing AND lasting > 4 weeks)    Protocols used: Headache-A-OH

## 2025-04-02 NOTE — TELEPHONE ENCOUNTER
verapamil (CALAN) 80 MG tablet     Disp Refills Start End AMANDA   topiramate (TOPAMAX) 50 MG tablet    [id number] - [place], [place] - [address] AT [facility] [address]

## 2025-04-03 ENCOUNTER — OFFICE VISIT (OUTPATIENT)
Dept: FAMILY MEDICINE | Facility: CLINIC | Age: 45
End: 2025-04-03
Payer: COMMERCIAL

## 2025-04-03 VITALS
RESPIRATION RATE: 20 BRPM | BODY MASS INDEX: 31.11 KG/M2 | HEART RATE: 77 BPM | OXYGEN SATURATION: 100 % | SYSTOLIC BLOOD PRESSURE: 118 MMHG | WEIGHT: 216.8 LBS | TEMPERATURE: 98.6 F | DIASTOLIC BLOOD PRESSURE: 70 MMHG

## 2025-04-03 DIAGNOSIS — G44.011 INTRACTABLE EPISODIC CLUSTER HEADACHE: Primary | ICD-10-CM

## 2025-04-03 DIAGNOSIS — R51.9 DAILY HEADACHE: ICD-10-CM

## 2025-04-03 DIAGNOSIS — G43.909 MIGRAINE WITHOUT STATUS MIGRAINOSUS, NOT INTRACTABLE, UNSPECIFIED MIGRAINE TYPE: ICD-10-CM

## 2025-04-03 RX ORDER — CEFUROXIME AXETIL 250 MG/1
6 TABLET ORAL
Status: CANCELLED | OUTPATIENT
Start: 2025-04-03

## 2025-04-03 RX ORDER — VERAPAMIL HYDROCHLORIDE 120 MG/1
120 TABLET, FILM COATED, EXTENDED RELEASE ORAL AT BEDTIME
Qty: 90 TABLET | Refills: 0 | Status: CANCELLED | OUTPATIENT
Start: 2025-04-03 | End: 2025-07-02

## 2025-04-03 RX ORDER — VERAPAMIL HYDROCHLORIDE 80 MG/1
80 TABLET ORAL 3 TIMES DAILY
Qty: 270 TABLET | Refills: 0 | Status: SHIPPED | OUTPATIENT
Start: 2025-04-03 | End: 2025-07-02

## 2025-04-03 RX ORDER — SUMATRIPTAN 50 MG/1
50 TABLET, FILM COATED ORAL
Qty: 5 TABLET | Refills: 0 | Status: SHIPPED | OUTPATIENT
Start: 2025-04-03 | End: 2025-04-08

## 2025-04-03 RX ORDER — TOPIRAMATE 50 MG/1
50 TABLET, FILM COATED ORAL 2 TIMES DAILY
Qty: 180 TABLET | Refills: 0 | Status: SHIPPED | OUTPATIENT
Start: 2025-04-03 | End: 2025-07-02

## 2025-04-03 ASSESSMENT — ENCOUNTER SYMPTOMS: HEADACHES: 1

## 2025-04-03 NOTE — PROGRESS NOTES
"  Assessment & Plan       Intractable episodic cluster headache  Migraine without status migrainosus, not intractable, unspecified migraine type  Daily headache  Exam today within normal limits.  Restarted patient's prophylactic migraine/headache medications today.  Placed neurology referral to [facility] for further eval.  Per last visit note in June 2023 patient was to follow-up in 3 months with .  - topiramate (TOPAMAX) 50 MG tablet  Dispense: 180 tablet; Refill: 0  - Adult Neurology  Referral  - verapamil (CALAN) 80 MG tablet  Dispense: 270 tablet; Refill: 0  - SUMAtriptan (IMITREX) 50 MG tablet  Dispense: 5 tablet; Refill: 0    Patient should follow up as needed if symptoms do not improve or for new or worsening symptoms. All questions answered to patient's satisfaction. Warning signs of when to seek emergency care were discussed.     The longitudinal plan of care for the diagnosis(es)/condition(s) as documented were addressed during this visit. Due to the added complexity in care, I will continue to support Dariusz in the subsequent management and with ongoing continuity of care.    Lakeshia Mack PA-C    31 minutes spent by me on the date of the encounter doing chart review, history and exam, documentation and further activities per the note      BMI  Estimated body mass index is 31.11 kg/m  as calculated from the following:    Height as of 11/12/24: 1.778 m (5' 10\").    Weight as of this encounter: 98.3 kg (216 lb 12.8 oz).   Weight management plan: Patient was referred to their PCP to discuss a diet and exercise plan.    Krupa Arzola is a 45 year old, presenting for the following health issues:  Headache        4/3/2025     4:00 PM   Additional Questions   Roomed by jaylin singh   Accompanied by self     History of Present Illness       Headaches:   Since the patient's last clinic visit, headaches are: worsened  The patient is getting headaches:  Everydayv  He is " "not able to do normal daily activities when he has a migraine.  The patient is taking the following rescue/relief medications:  Ibuprofen (Advil, Motrin) and Tylenol   Patient states \"I get only a small amount of relief\" from the rescue/relief medications.   The patient is taking the following medications to prevent migraines:  No medications to prevent migraines  In the past 4 weeks, the patient has gone to an Urgent Care or Emergency Room 0 times times due to headaches.    He eats 2-3 servings of fruits and vegetables daily.He consumes 1 sweetened beverage(s) daily.He exercises with enough effort to increase his heart rate 10 to 19 minutes per day.  He exercises with enough effort to increase his heart rate 4 days per week.   He is taking medications regularly.      Dariusz is a 45 year old male who presents today for follow up on headaches: cluster vs migrainous.     He was previously taking verapamil 80mg TID and topamax 50mg daily for headache prevention. This was stopped in November, patient is unsure why.    He describes the headaches as a stabbing pain in his left side of his head.  Sometimes the headaches cause the left eye to tear up. Pain improves slightly with being in the dark and quiet space.  He has been taking Tylenol for pain control.  He feels like the migraine/headache has been present for the last month continuously but varying in intensity.  Does feel better when he is taking prophylactic medications.  He presents today to the clinic due to running out of sick time from work.  He does not want a work note today, but may return with [facility] paperwork if migraines continue.    Triage 4/2/25:  Called patient to get more information given meds were discontinued in November.   Requesting refills to control migraines      Background   Verapamil, Topiramate was discontinued 11/2024-  previous took the medications for migraine control.      Assessment.   Migraines are back   Mild to severe Migraine for 3 " weeks straight      Same location as migraines in the past- Left side of head  Light and noise sensitivity    Mild nausea at times.   Migraines varies from mild to severe      While on the medications sometimes migraines would be 1-2 days a month   Sometimes migraine would last a few hours to a few days, varying in intensity.   Patient states when on the mediation the frequency and intensity of migraines was better.      Migraine headache is starting to effect his work.      Patient states he has been taking Tylenol     Review of Systems  Constitutional, neuro, ENT, endocrine, pulmonary, cardiac, gastrointestinal, genitourinary, musculoskeletal, integument and psychiatric systems are negative, except as otherwise noted.      Objective    /70   Pulse 77   Temp 98.6  F (37  C) (Tympanic)   Resp 20   Wt 98.3 kg (216 lb 12.8 oz)   SpO2 100%   BMI 31.11 kg/m    Body mass index is 31.11 kg/m .  Physical Exam   GENERAL: alert and no distress  EYES: Eyes grossly normal to inspection, PERRL and conjunctivae and sclerae normal  HENT: ear canals and TM's normal, nose and mouth without ulcers or lesions  NECK: no adenopathy, no asymmetry, masses, or scars  RESP: lungs clear to auscultation - no rales, rhonchi or wheezes  CV: regular rate and rhythm, normal S1 S2, no S3 or S4, no murmur, click or rub, no peripheral edema  MS: no gross musculoskeletal defects noted, no edema  SKIN: no suspicious lesions or rashes  NEURO: Normal strength and tone, sensory exam grossly normal, mentation intact, cranial nerves 2-12 intact, and rapid alternating movements normal  PSYCH: mentation appears normal, affect normal/bright    Signed Electronically by: Lakeshia Mack PA-C

## 2025-04-07 ENCOUNTER — OFFICE VISIT (OUTPATIENT)
Dept: FAMILY MEDICINE | Facility: CLINIC | Age: 45
End: 2025-04-07
Payer: COMMERCIAL

## 2025-04-07 VITALS
HEART RATE: 79 BPM | SYSTOLIC BLOOD PRESSURE: 118 MMHG | DIASTOLIC BLOOD PRESSURE: 71 MMHG | RESPIRATION RATE: 12 BRPM | HEIGHT: 70 IN | WEIGHT: 216 LBS | BODY MASS INDEX: 30.92 KG/M2 | OXYGEN SATURATION: 100 % | TEMPERATURE: 97.4 F

## 2025-04-07 DIAGNOSIS — R51.9 DAILY HEADACHE: ICD-10-CM

## 2025-04-07 DIAGNOSIS — G44.011 INTRACTABLE EPISODIC CLUSTER HEADACHE: Primary | ICD-10-CM

## 2025-04-07 DIAGNOSIS — E55.9 HYPOVITAMINOSIS D: ICD-10-CM

## 2025-04-07 DIAGNOSIS — G43.909 MIGRAINE WITHOUT STATUS MIGRAINOSUS, NOT INTRACTABLE, UNSPECIFIED MIGRAINE TYPE: ICD-10-CM

## 2025-04-07 DIAGNOSIS — R73.03 PREDIABETES: ICD-10-CM

## 2025-04-07 LAB
ANION GAP SERPL CALCULATED.3IONS-SCNC: 10 MMOL/L (ref 7–15)
BUN SERPL-MCNC: 11.8 MG/DL (ref 6–20)
CALCIUM SERPL-MCNC: 9.4 MG/DL (ref 8.8–10.4)
CHLORIDE SERPL-SCNC: 107 MMOL/L (ref 98–107)
CREAT SERPL-MCNC: 1.02 MG/DL (ref 0.67–1.17)
CRP SERPL-MCNC: <3 MG/L
EGFRCR SERPLBLD CKD-EPI 2021: >90 ML/MIN/1.73M2
ERYTHROCYTE [SEDIMENTATION RATE] IN BLOOD BY WESTERGREN METHOD: 6 MM/HR (ref 0–15)
GLUCOSE SERPL-MCNC: 78 MG/DL (ref 70–99)
HCO3 SERPL-SCNC: 22 MMOL/L (ref 22–29)
MAGNESIUM SERPL-MCNC: 2.1 MG/DL (ref 1.7–2.3)
POTASSIUM SERPL-SCNC: 4.2 MMOL/L (ref 3.4–5.3)
SODIUM SERPL-SCNC: 139 MMOL/L (ref 135–145)
TSH SERPL DL<=0.005 MIU/L-ACNC: 1.15 UIU/ML (ref 0.3–4.2)
VIT B12 SERPL-MCNC: 502 PG/ML (ref 232–1245)
VIT D+METAB SERPL-MCNC: 14 NG/ML (ref 20–50)

## 2025-04-07 RX ORDER — PREDNISONE 10 MG/1
TABLET ORAL
Qty: 63 TABLET | Refills: 0 | Status: CANCELLED | OUTPATIENT
Start: 2025-04-07 | End: 2025-04-25

## 2025-04-07 RX ORDER — PREDNISONE 20 MG/1
40 TABLET ORAL DAILY
Qty: 10 TABLET | Refills: 0 | Status: SHIPPED | OUTPATIENT
Start: 2025-04-07 | End: 2025-04-12

## 2025-04-07 RX ORDER — KETOROLAC TROMETHAMINE 30 MG/ML
30 INJECTION, SOLUTION INTRAMUSCULAR; INTRAVENOUS ONCE
Status: COMPLETED | OUTPATIENT
Start: 2025-04-07 | End: 2025-04-07

## 2025-04-07 RX ORDER — VERAPAMIL HYDROCHLORIDE 80 MG/1
TABLET ORAL
Qty: 270 TABLET | Refills: 0 | Status: SHIPPED | OUTPATIENT
Start: 2025-04-07

## 2025-04-07 RX ADMIN — KETOROLAC TROMETHAMINE 30 MG: 30 INJECTION, SOLUTION INTRAMUSCULAR; INTRAVENOUS at 12:14

## 2025-04-07 NOTE — RESULT ENCOUNTER NOTE
Dear Dariusz,    Here is a summary of your recent test results:    Your heart rate is running just a little bit below 60 on your EKG and your verapamil can lower you more.    As we discussed please only take half a tablet 40 mg 3 times a day until you are seen again by neurology or us to make sure your heart rate and blood pressure does not go too low.    For additional lab test information, [email or web address] is an excellent reference.    In addition, here is a list of due or overdue Health Maintenance reminders:    ANNUAL REVIEW OF  ORDERS Never done  Discuss Advance Care Planning Never done  Colorectal Cancer Screening Never done  Hepatitis B Vaccine(1 of 3 - 19+ 3-dose series) Never done  COVID-19 Vaccine(3 - Moderna risk series) due on 05/28/2021    Please call us at [phone] (or use AddMyBest) to address the above recommendations if needed.    Thank you for choosing [facility].  It was an honor and a privilege to participate in your care.       Healthy regards,    Charu Julian, MILTON  [facility]

## 2025-04-07 NOTE — PROGRESS NOTES
"  Assessment & Plan     Intractable episodic cluster headache  ***  - ketorolac (TORADOL) injection 30 mg  - EKG 12-lead complete w/read - Clinics  - Adult Neurology  Referral    Daily headache  ***  - ketorolac (TORADOL) injection 30 mg  - Adult Neurology  Referral    Hypovitaminosis D  ***    Prediabetes  ***        No follow-ups on file.     Krupa Arzola is a 45 year old, presenting for the following health issues:  Follow Up (Migraines)        4/7/2025    11:20 AM   Additional Questions   Roomed by Wagner BARRON   Accompanied by Self     History of Present Illness       Headaches:   Since the patient's last clinic visit, headaches are: worsened  The patient is getting headaches:  Everydayv  He is not able to do normal daily activities when he has a migraine.  The patient is taking the following rescue/relief medications:  Ibuprofen (Advil, Motrin) and Tylenol   Patient states \"I get only a small amount of relief\" from the rescue/relief medications.   The patient is taking the following medications to prevent migraines:  No medications to prevent migraines  In the past 4 weeks, the patient has gone to an Urgent Care or Emergency Room 0 times times due to headaches.    He eats 2-3 servings of fruits and vegetables daily.He consumes 1 sweetened beverage(s) daily.He exercises with enough effort to increase his heart rate 10 to 19 minutes per day.  He exercises with enough effort to increase his heart rate 4 days per week.   He is taking medications regularly.            Immediate release: Initial: 40 to 80 mg 3 times daily; increase dose every 1 to 2 weeks until headaches subside or adverse reactions develop; usual effective dose: 240 to 480 mg/day in 3 to 4 divided doses      LOV - 04/03/2025 - prescribed Sumatriptan 50mg PRN, Topiramate 50mg - BID, Verapamil 80mg TID.    Started Verapamil yesterday.    Is feeling tingling in left side of head - more persistent and neck pain, tingling in both " "hands and numbness in right foot.   Tingling and numbness both come and go    Headache - left side of head and across neck. - Feels medications are making the symptoms worse.   Eyes bothered by light, ears are sensitive to noise.     Feels like he needs to vomit but cannot, no upset stomach just pain is effecting his appetite - is still eating though.       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 Normal MRI cervical spine.  -For prophylactic therapy will start Verapamil 80mg TID. Will I recommended EKG that he can have at his pcp office. Recommend Gammacore vagal nerve stimulation device but he would like to hold off at this time.   -For abortive therapy will start Sumatriptan 6mg Subcutaneous injection. He prefers not to use high flow oxygen because of the possible costs. He also was prescribed a course of steroids from the ED so he can take that now.  -Recommended life style changes of regular meals, scheduled sleep and regular exercise of 40  minutes 3 times a week and staying well hydrated.  -Low Vitamin D: Continue 50,000IU per week.   -Overweight: Healthy BMI is between 18.5-24.9 Body mass index is 28.84 kg/m . Have counseled on good healthy diet and exercise.  -Follow up in 3 months.             Referred By Contact Referred To Contact      Diagnoses   Intractable episodic cluster headache      Procedures   REFERRAL FOR PROCEDURE (N ONLY)  Joaquin Bravo MD   [address] [place] [address]   Phone: [phone]   Fax: [phone]            {ROS Picklists (Optional):160599}      Objective    /71 (BP Location: Right arm, Patient Position: Chair, Cuff Size: Adult Regular)   Pulse 79   Temp 97.4  F (36.3  C) (Tympanic)   Resp 12   Ht 1.778 m (5' 10\")   Wt 98 kg (216 lb)   SpO2 100%   BMI 30.99 kg/m    Body mass index is 30.99 kg/m .  Physical Exam   {Exam List (Optional):100742}    {Diagnostic Test Results " (Optional):463274}        Signed Electronically by: CORDELIA Olmos CNP  {Email feedback regarding this note to [email or web address]   :173550}

## 2025-04-07 NOTE — PROGRESS NOTES
Please send referral to neurology office at [facility].     Joaquin Bravo MD   [address] [address]   Phone: [phone]   Fax: [phone]            Charu Julian, FNP-BC

## 2025-04-17 ENCOUNTER — TELEPHONE (OUTPATIENT)
Dept: FAMILY MEDICINE | Facility: CLINIC | Age: 45
End: 2025-04-17
Payer: COMMERCIAL

## 2025-04-17 NOTE — TELEPHONE ENCOUNTER
What is this FMLA for?  I suspect it is a renewal for headaches.  Regardless, needs appt to complete/update - saw Charu Julian most recently for HA - could be with either of us.  Video visit carolyn Almonte MBA, MS, PA-LAYLA  [facility] [place]

## 2025-04-17 NOTE — TELEPHONE ENCOUNTER
Forms/Letter Request    Type of form/letter: FMLA - Unknown      Is Release of Information needed?: No    Do we have the form/letter: Yes:     Who is the form from? Insurance comp    Where did/will the form come from? form was faxed in    When is form/letter needed by: asap    How would you like the form/letter returned: Fax : [phone]    Patient Notified form requests are processed in 5-7 business days:No    Could we send this information to you in [employer] or would you prefer to receive a phone call?:   na    Put in providers in box    Ashley K

## 2025-04-18 ENCOUNTER — VIRTUAL VISIT (OUTPATIENT)
Dept: FAMILY MEDICINE | Facility: CLINIC | Age: 45
End: 2025-04-18
Payer: COMMERCIAL

## 2025-04-18 DIAGNOSIS — Q27.9 VENOUS ANOMALY: ICD-10-CM

## 2025-04-18 DIAGNOSIS — R51.9 WORSENING HEADACHES: Primary | ICD-10-CM

## 2025-04-18 DIAGNOSIS — G44.011 INTRACTABLE EPISODIC CLUSTER HEADACHE: ICD-10-CM

## 2025-04-18 PROCEDURE — 98005 SYNCH AUDIO-VIDEO EST LOW 20: CPT | Performed by: NURSE PRACTITIONER

## 2025-04-18 RX ORDER — GABAPENTIN 100 MG/1
100-300 CAPSULE ORAL 3 TIMES DAILY PRN
Qty: 30 CAPSULE | Refills: 1 | Status: SHIPPED | OUTPATIENT
Start: 2025-04-18

## 2025-04-18 NOTE — PROGRESS NOTES
Dariusz is a 45 year old who is being evaluated via a billable video visit.    How would you like to obtain your AVS? MyChart  If the video visit is dropped, the invitation should be resent by: Text to cell phone: [phone]  Will anyone else be joining your video visit? No    Assessment & Plan     Worsening headaches    - Adult Neurology  Referral  - MR Brain w/o Contrast    Intractable episodic cluster headache    - Adult Neurology  Referral  - MR Brain w/o Contrast  - gabapentin (NEURONTIN) 100 MG capsule  Dispense: 30 capsule; Refill: 1    Venous anomaly    - Adult Neurology  Referral  - MR Brain w/o Contrast      Assessment & Plan  Cluster headaches  - symptoms and neurology notes seem consistent with cluster headaches with episodic flare-ups, associated with nerve pain and stiffness on the left side of the neck.   Previous treatment included verapamil and sumatriptan injections; had  allergic reaction to tripans.  - Referral to [facility] for earlier consultation. Order MRI to assess any changes in brain malformation. Prescribe gabapentin for nerve pain, with instructions to start with one capsule and increase up to three if needed. Consider ordering oxygen therapy if insurance covers it.  - Risks and side effects: gabapentin may cause drowsiness; patient advised to be cautious.    Return in about 12 weeks (around 7/11/2025) for Recheck.     Subjective   Dariusz is a 45 year old, presenting for the following health issues:  Forms (FMLA)    AI scribe/documentation tool was used in the creation of this note.This note/dictation was performed and completed with the assistance of voice recognition software and an AI scribe. It may contain inadvertant transcription  errors,  omissions and/or  inadvertent word substitution.         4/18/2025     2:57 PM   Additional Questions   Roomed by Wagner CMA   Accompanied by Self     History of Present Illness       Reason for  visit:  Follow  up   He is taking medications regularly.   - Dariusz Teague has been experiencing headaches since March 2023.  - Headaches are described as cluster headaches, occurring in episodic flare-ups.  - Reports stiffness on the left side of the neck  which may be related to nerve issues.  - Headaches can be severe, lasting for about 30 minutes, and occur in intervals.  - Experiences tingling and pain in one to two fingers as a trigger for headaches.  - Massaging the affected area can alleviate the headache.  - Previously used Topamax, which was discontinued on the advice of neurology.  - Currently taking verapamil for headache management.  - Received script for injectable sumatriptan for severe headaches, but experienced an allergic reaction characterized by shortness of breath and increased heart rate.  - Vitamin D levels were found to be low in previous labs. Just ordered weekly Vitamin D   - Has missed work intermittently due to headaches, with episodes occurring one to three times a week.  - Has a left cerebellar developmental venous anomaly without definite cavernous venous malformation or associated abnormal parenchymal signal noted on previous MRI imaging    Complete [employer] paperwork pt faxed forms over.           Review of Systems  Constitutional, neuro, ENT, endocrine, pulmonary, cardiac, gastrointestinal, genitourinary, musculoskeletal, integument and psychiatric systems are negative, except as otherwise noted.      Objective           Vitals:  No vitals were obtained today due to virtual visit.    Physical Exam   GENERAL: alert and no distress  EYES: Eyes grossly normal to inspection.  No discharge or erythema, or obvious scleral/conjunctival abnormalities.  RESP: No audible wheeze, cough, or visible cyanosis.    SKIN: Visible skin clear. No significant rash, abnormal pigmentation or lesions.  NEURO: Cranial nerves grossly intact.  Mentation and speech appropriate for age.  PSYCH: Appropriate  affect, tone, and pace of words        Video-Visit Details    Type of service:  Video Visit   Originating Location (pt. Location): Home  Distant Location (provider location):  On-site  Platform used for Video Visit: Yue     Signed Electronically by: CORDELIA Olmos CNP

## 2025-04-20 ENCOUNTER — TELEPHONE (OUTPATIENT)
Dept: FAMILY MEDICINE | Facility: CLINIC | Age: 45
End: 2025-04-20
Payer: COMMERCIAL

## 2025-04-20 DIAGNOSIS — G44.011 INTRACTABLE EPISODIC CLUSTER HEADACHE: ICD-10-CM

## 2025-04-20 NOTE — PATIENT INSTRUCTIONS
Based on our discussion, I have outlined the following instructions for you:    Paperwork done for being out of work intermittently for headaches.   - You will be referred to the [facility] for an earlier appointment.  - An MRI will be scheduled to check for any changes in your brain.  - You will start taking gabapentin for nerve pain. Begin with one capsule and you can increase up to three capsules if you feel it's necessary.  - We might consider oxygen therapy if your insurance covers it for cluster headaches.  - Be aware that gabapentin might make you feel sleepy, so please be careful with activities that require full alertness.    Thank you again for your visit, and we look forward to supporting you in your journey to better health.

## 2025-04-21 DIAGNOSIS — G44.011 INTRACTABLE EPISODIC CLUSTER HEADACHE: ICD-10-CM

## 2025-04-21 RX ORDER — GABAPENTIN 100 MG/1
CAPSULE ORAL
Qty: 30 CAPSULE | Refills: 1 | OUTPATIENT
Start: 2025-04-21

## 2025-04-21 NOTE — TELEPHONE ENCOUNTER
Called pharmacy  regarding Gabapentin prescription    Rx is ready to , pharmacy states there is not an issue with the rx.      Called patient and explained gabapentin rx was sent in to [facility] in [place] on friday and is ready for     Patient states understanding and denied ?'s

## 2025-04-21 NOTE — TELEPHONE ENCOUNTER
This was just filled 3 days ago.   He would not be out and has a refill.   Please call pharmacy/pt and inquire what is the concern?    Outpatient Medications      []    gabapentin (NEURONTIN) 100 MG capsule Take 1-3 capsules (100-300 mg) by mouth 3 times daily as needed for neuropathic pain or other (headache). 30 capsule 1 ordered 04/18/2025                Charu Julian, FNP-BC

## 2025-04-22 ENCOUNTER — TELEPHONE (OUTPATIENT)
Dept: FAMILY MEDICINE | Facility: CLINIC | Age: 45
End: 2025-04-22
Payer: COMMERCIAL

## 2025-04-22 NOTE — TELEPHONE ENCOUNTER
Forms/Letter Request    Type of form/letter: Judith - Claims management [employer] Clarification is needed regarding healthcare cert form of pt - form was illedgable  unclear or incomplete    Do we have the form/letter:     Who is the form from? Insurance comp    Where did/will the form come from? form was faxed in    When is form/letter needed by: asap    How would you like the form/letter returned: Fax : [phone]    Patient Notified form requests are processed in 5-7 business days:No    Could we send this information to you in [email or web address] or would you prefer to receive a phone call?:   na    Put in providers in box    Ashley K

## 2025-04-22 NOTE — TELEPHONE ENCOUNTER
Please send forms to Charu Julian CNP as she saw pt for this issue.        Elvira Almonte MBA, MS, PA-C  [facility] [place]

## 2025-04-22 NOTE — TELEPHONE ENCOUNTER
Forms/Letter Request    Type of form/letter: FMLA - Intermittent     Number of days per episode:  1-3  Number of episodes per month:  varies    Do we have the form/letter: Yes: question 7 was not filled on the previous form. Needs the question answered and resent to patient, and provider did not sign the bottom    Who is the form from? Patient employer    Where did/will the form come from? form was faxed in    When is form/letter needed by: 1-2 days    How would you like the form/letter returned: Fax : [phone]    Patient Notified form requests are processed in 5-7 business days:Yes    Could we send this information to you in [employer] or would you prefer to receive a phone call?:   No preference   Okay to leave a detailed message?: Yes at Cell number on file:    Telephone Information:   Mobile [phone]

## 2025-04-23 NOTE — TELEPHONE ENCOUNTER
Form was faxed before it was completed.   Form completed and placed in  [place]/  basket to be faxed.      Healthy regards,            Charu Julian, FNP-BC

## 2025-04-23 NOTE — TELEPHONE ENCOUNTER
Faxed signed FMLA to Judith 1 [phone]    Filed in [place] folders in [place]    Copied into [facility]

## 2025-04-24 ENCOUNTER — TRANSFERRED RECORDS (OUTPATIENT)
Dept: HEALTH INFORMATION MANAGEMENT | Facility: CLINIC | Age: 45
End: 2025-04-24

## 2025-05-13 NOTE — CONFIDENTIAL NOTE
NEUROLOGY - PRE VISIT PLANNING             Referring Provider Reason for Referral Office Visit Notes   Charu Julian, APRN CNP   [facility] Worsening headaches   Intractable episodic cluster headache   Venous anomaly  4/18/2025        IMAGING/NOTES/SCANS Status/ Location  DATE   MRI/MRA(HEAD, NECK, SPINE) Internal   External - [facility] - PACS 5/27/2023 4/20/2025, 10/30/2021, 6/15/2018     CT/CTA   External - [facility] - PACS  External - [facility] - Requested 5/09/2013 2/5/2018    EMG Internal, External, N/A    EEG Internal, External, N/A    LABS Internal    Neuropsychological testing  N/A    Additional Testing/Notes External - Care Everywhere  Joaquin Bravo MD,  Dung Santos MD   [facility]     Melo Sterling MD - [facility] 6/14/2023 - 4/4/2025          3/7/2020     Does patient have C2C?  Year last updated Action     YES   []      Please update at appointment if outdated more than 5 years       NO     [x]   N/A   Please complete C2C at appointment

## 2025-05-29 ENCOUNTER — TRANSFERRED RECORDS (OUTPATIENT)
Dept: HEALTH INFORMATION MANAGEMENT | Facility: CLINIC | Age: 45
End: 2025-05-29
Payer: COMMERCIAL

## 2025-07-10 ENCOUNTER — TELEPHONE (OUTPATIENT)
Dept: NEUROLOGY | Facility: CLINIC | Age: 45
End: 2025-07-10
Payer: COMMERCIAL

## 2025-07-10 NOTE — TELEPHONE ENCOUNTER
Attempted to reach patient to remind them about appointment scheduled with Cyn Parra PA-C on 7/11/25 in our [place] clinic.    A voicemail was left with a call back number if the patient has questions or would like to reschedule.

## 2025-07-11 ENCOUNTER — PRE VISIT (OUTPATIENT)
Dept: NEUROLOGY | Facility: CLINIC | Age: 45
End: 2025-07-11